# Patient Record
Sex: FEMALE | Race: WHITE | NOT HISPANIC OR LATINO | Employment: FULL TIME | URBAN - METROPOLITAN AREA
[De-identification: names, ages, dates, MRNs, and addresses within clinical notes are randomized per-mention and may not be internally consistent; named-entity substitution may affect disease eponyms.]

---

## 2017-01-13 ENCOUNTER — ALLSCRIPTS OFFICE VISIT (OUTPATIENT)
Dept: OTHER | Facility: OTHER | Age: 42
End: 2017-01-13

## 2017-03-16 ENCOUNTER — ALLSCRIPTS OFFICE VISIT (OUTPATIENT)
Dept: OTHER | Facility: OTHER | Age: 42
End: 2017-03-16

## 2017-04-06 ENCOUNTER — ALLSCRIPTS OFFICE VISIT (OUTPATIENT)
Dept: OTHER | Facility: OTHER | Age: 42
End: 2017-04-06

## 2017-05-01 ENCOUNTER — ALLSCRIPTS OFFICE VISIT (OUTPATIENT)
Dept: OTHER | Facility: OTHER | Age: 42
End: 2017-05-01

## 2017-05-16 ENCOUNTER — GENERIC CONVERSION - ENCOUNTER (OUTPATIENT)
Dept: OTHER | Facility: OTHER | Age: 42
End: 2017-05-16

## 2017-06-09 DIAGNOSIS — T78.40XA ALLERGIC STATE: ICD-10-CM

## 2017-06-13 ENCOUNTER — APPOINTMENT (OUTPATIENT)
Dept: LAB | Facility: CLINIC | Age: 42
End: 2017-06-13
Payer: COMMERCIAL

## 2017-06-13 DIAGNOSIS — T78.40XA ALLERGIC STATE: ICD-10-CM

## 2017-06-13 PROCEDURE — 36415 COLL VENOUS BLD VENIPUNCTURE: CPT

## 2017-06-13 PROCEDURE — 86038 ANTINUCLEAR ANTIBODIES: CPT

## 2017-06-14 ENCOUNTER — GENERIC CONVERSION - ENCOUNTER (OUTPATIENT)
Dept: OTHER | Facility: OTHER | Age: 42
End: 2017-06-14

## 2017-06-14 LAB — RYE IGE QN: NEGATIVE

## 2017-07-01 DIAGNOSIS — E55.9 VITAMIN D DEFICIENCY: ICD-10-CM

## 2017-07-02 ENCOUNTER — APPOINTMENT (EMERGENCY)
Dept: CT IMAGING | Facility: HOSPITAL | Age: 42
End: 2017-07-02
Payer: COMMERCIAL

## 2017-07-02 ENCOUNTER — HOSPITAL ENCOUNTER (OUTPATIENT)
Facility: HOSPITAL | Age: 42
Setting detail: OBSERVATION
Discharge: LEFT BEFORE DISCHARGE | End: 2017-07-03
Attending: EMERGENCY MEDICINE | Admitting: HOSPITALIST
Payer: COMMERCIAL

## 2017-07-02 DIAGNOSIS — K52.9 GASTROENTERITIS: Primary | ICD-10-CM

## 2017-07-02 DIAGNOSIS — R10.9 ABDOMINAL PAIN: ICD-10-CM

## 2017-07-02 LAB
ALBUMIN SERPL BCP-MCNC: 4.7 G/DL (ref 3.5–5)
ALP SERPL-CCNC: 68 U/L (ref 46–116)
ALT SERPL W P-5'-P-CCNC: 30 U/L (ref 12–78)
ANION GAP SERPL CALCULATED.3IONS-SCNC: 12 MMOL/L (ref 4–13)
AST SERPL W P-5'-P-CCNC: 21 U/L (ref 5–45)
BASOPHILS # BLD AUTO: 0.04 THOUSANDS/ΜL (ref 0–0.1)
BASOPHILS NFR BLD AUTO: 0 % (ref 0–1)
BILIRUB SERPL-MCNC: 0.5 MG/DL (ref 0.2–1)
BILIRUB UR QL STRIP: NEGATIVE
BUN SERPL-MCNC: 28 MG/DL (ref 5–25)
CALCIUM SERPL-MCNC: 9.7 MG/DL (ref 8.3–10.1)
CHLORIDE SERPL-SCNC: 98 MMOL/L (ref 100–108)
CLARITY UR: CLEAR
CLARITY, POC: NORMAL
CO2 SERPL-SCNC: 33 MMOL/L (ref 21–32)
COLOR UR: YELLOW
COLOR, POC: YELLOW
CREAT SERPL-MCNC: 0.8 MG/DL (ref 0.6–1.3)
EOSINOPHIL # BLD AUTO: 0.04 THOUSAND/ΜL (ref 0–0.61)
EOSINOPHIL NFR BLD AUTO: 0 % (ref 0–6)
ERYTHROCYTE [DISTWIDTH] IN BLOOD BY AUTOMATED COUNT: 12.4 % (ref 11.6–15.1)
EXT BILIRUBIN, UA: NEGATIVE
EXT BLOOD URINE: NEGATIVE
EXT GLUCOSE, UA: NEGATIVE
EXT KETONES: NORMAL
EXT NITRITE, UA: NEGATIVE
EXT PH, UA: 5
EXT PROTEIN, UA: NORMAL
EXT SPECIFIC GRAVITY, UA: 1.03
EXT UROBILINOGEN: 0.2
GFR SERPL CREATININE-BSD FRML MDRD: >60 ML/MIN/1.73SQ M
GLUCOSE SERPL-MCNC: 98 MG/DL (ref 65–140)
GLUCOSE UR STRIP-MCNC: NEGATIVE MG/DL
HCG UR QL: NEGATIVE
HCT VFR BLD AUTO: 47.8 % (ref 34.8–46.1)
HGB BLD-MCNC: 16.2 G/DL (ref 11.5–15.4)
HGB UR QL STRIP.AUTO: NEGATIVE
HOLD SPECIMEN: NORMAL
KETONES UR STRIP-MCNC: ABNORMAL MG/DL
LACTATE SERPL-SCNC: 1.1 MMOL/L (ref 0.5–2)
LEUKOCYTE ESTERASE UR QL STRIP: NEGATIVE
LIPASE SERPL-CCNC: 191 U/L (ref 73–393)
LYMPHOCYTES # BLD AUTO: 1.8 THOUSANDS/ΜL (ref 0.6–4.47)
LYMPHOCYTES NFR BLD AUTO: 9 % (ref 14–44)
MCH RBC QN AUTO: 29.8 PG (ref 26.8–34.3)
MCHC RBC AUTO-ENTMCNC: 33.9 G/DL (ref 31.4–37.4)
MCV RBC AUTO: 88 FL (ref 82–98)
MONOCYTES # BLD AUTO: 1.94 THOUSAND/ΜL (ref 0.17–1.22)
MONOCYTES NFR BLD AUTO: 10 % (ref 4–12)
NEUTROPHILS # BLD AUTO: 15.61 THOUSANDS/ΜL (ref 1.85–7.62)
NEUTS SEG NFR BLD AUTO: 81 % (ref 43–75)
NITRITE UR QL STRIP: NEGATIVE
PH UR STRIP.AUTO: 5 [PH] (ref 4.5–8)
PLATELET # BLD AUTO: 345 THOUSANDS/UL (ref 149–390)
PMV BLD AUTO: 10 FL (ref 8.9–12.7)
POTASSIUM SERPL-SCNC: 3.5 MMOL/L (ref 3.5–5.3)
PROT SERPL-MCNC: 8.8 G/DL (ref 6.4–8.2)
PROT UR STRIP-MCNC: NEGATIVE MG/DL
RBC # BLD AUTO: 5.43 MILLION/UL (ref 3.81–5.12)
SODIUM SERPL-SCNC: 143 MMOL/L (ref 136–145)
SP GR UR STRIP.AUTO: >=1.03 (ref 1–1.03)
UROBILINOGEN UR QL STRIP.AUTO: 0.2 E.U./DL
WBC # BLD AUTO: 19.43 THOUSAND/UL (ref 4.31–10.16)
WBC # BLD EST: NEGATIVE 10*3/UL

## 2017-07-02 PROCEDURE — 96361 HYDRATE IV INFUSION ADD-ON: CPT

## 2017-07-02 PROCEDURE — 81002 URINALYSIS NONAUTO W/O SCOPE: CPT | Performed by: EMERGENCY MEDICINE

## 2017-07-02 PROCEDURE — 96375 TX/PRO/DX INJ NEW DRUG ADDON: CPT

## 2017-07-02 PROCEDURE — 74177 CT ABD & PELVIS W/CONTRAST: CPT

## 2017-07-02 PROCEDURE — 87040 BLOOD CULTURE FOR BACTERIA: CPT | Performed by: EMERGENCY MEDICINE

## 2017-07-02 PROCEDURE — 80053 COMPREHEN METABOLIC PANEL: CPT | Performed by: EMERGENCY MEDICINE

## 2017-07-02 PROCEDURE — 85025 COMPLETE CBC W/AUTO DIFF WBC: CPT | Performed by: EMERGENCY MEDICINE

## 2017-07-02 PROCEDURE — 81025 URINE PREGNANCY TEST: CPT | Performed by: EMERGENCY MEDICINE

## 2017-07-02 PROCEDURE — 83605 ASSAY OF LACTIC ACID: CPT | Performed by: EMERGENCY MEDICINE

## 2017-07-02 PROCEDURE — 93005 ELECTROCARDIOGRAM TRACING: CPT | Performed by: EMERGENCY MEDICINE

## 2017-07-02 PROCEDURE — 83690 ASSAY OF LIPASE: CPT | Performed by: EMERGENCY MEDICINE

## 2017-07-02 PROCEDURE — 36415 COLL VENOUS BLD VENIPUNCTURE: CPT | Performed by: EMERGENCY MEDICINE

## 2017-07-02 PROCEDURE — 81003 URINALYSIS AUTO W/O SCOPE: CPT | Performed by: EMERGENCY MEDICINE

## 2017-07-02 RX ORDER — SODIUM CHLORIDE 9 MG/ML
125 INJECTION, SOLUTION INTRAVENOUS CONTINUOUS
Status: DISCONTINUED | OUTPATIENT
Start: 2017-07-02 | End: 2017-07-03 | Stop reason: HOSPADM

## 2017-07-02 RX ORDER — ONDANSETRON 2 MG/ML
4 INJECTION INTRAMUSCULAR; INTRAVENOUS ONCE
Status: COMPLETED | OUTPATIENT
Start: 2017-07-02 | End: 2017-07-02

## 2017-07-02 RX ORDER — DIPHENOXYLATE HYDROCHLORIDE AND ATROPINE SULFATE 2.5; .025 MG/1; MG/1
1 TABLET ORAL DAILY
COMMUNITY
End: 2018-10-09

## 2017-07-02 RX ORDER — ONDANSETRON 2 MG/ML
4 INJECTION INTRAMUSCULAR; INTRAVENOUS ONCE
Status: COMPLETED | OUTPATIENT
Start: 2017-07-02 | End: 2017-07-03

## 2017-07-02 RX ORDER — ONDANSETRON 2 MG/ML
INJECTION INTRAMUSCULAR; INTRAVENOUS
Status: COMPLETED
Start: 2017-07-02 | End: 2017-07-02

## 2017-07-02 RX ORDER — ONDANSETRON HYDROCHLORIDE 4 MG/5ML
4 SOLUTION ORAL ONCE
Status: DISCONTINUED | OUTPATIENT
Start: 2017-07-02 | End: 2017-07-03 | Stop reason: HOSPADM

## 2017-07-02 RX ORDER — NADOLOL 20 MG/1
20 TABLET ORAL DAILY
COMMUNITY
End: 2018-04-10 | Stop reason: SDUPTHER

## 2017-07-02 RX ORDER — LEVOFLOXACIN 5 MG/ML
750 INJECTION, SOLUTION INTRAVENOUS ONCE
Status: COMPLETED | OUTPATIENT
Start: 2017-07-02 | End: 2017-07-03

## 2017-07-02 RX ADMIN — FAMOTIDINE 20 MG: 10 INJECTION, SOLUTION INTRAVENOUS at 22:20

## 2017-07-02 RX ADMIN — ONDANSETRON 4 MG: 2 INJECTION INTRAMUSCULAR; INTRAVENOUS at 22:28

## 2017-07-02 RX ADMIN — IOHEXOL 50 ML: 240 INJECTION, SOLUTION INTRATHECAL; INTRAVASCULAR; INTRAVENOUS; ORAL at 22:20

## 2017-07-02 RX ADMIN — SODIUM CHLORIDE 500 ML: 0.9 INJECTION, SOLUTION INTRAVENOUS at 22:21

## 2017-07-02 RX ADMIN — SODIUM CHLORIDE 500 ML: 0.9 INJECTION, SOLUTION INTRAVENOUS at 22:15

## 2017-07-03 ENCOUNTER — APPOINTMENT (OUTPATIENT)
Dept: ULTRASOUND IMAGING | Facility: HOSPITAL | Age: 42
End: 2017-07-03
Payer: COMMERCIAL

## 2017-07-03 VITALS
WEIGHT: 133.4 LBS | SYSTOLIC BLOOD PRESSURE: 135 MMHG | OXYGEN SATURATION: 100 % | RESPIRATION RATE: 18 BRPM | TEMPERATURE: 97.8 F | HEIGHT: 64 IN | HEART RATE: 72 BPM | BODY MASS INDEX: 22.77 KG/M2 | DIASTOLIC BLOOD PRESSURE: 82 MMHG

## 2017-07-03 PROBLEM — K52.9 GASTROENTERITIS: Status: ACTIVE | Noted: 2017-07-03

## 2017-07-03 PROBLEM — R10.9 ABDOMINAL PAIN: Status: ACTIVE | Noted: 2017-07-03

## 2017-07-03 PROBLEM — A41.9 SEPSIS (HCC): Status: ACTIVE | Noted: 2017-07-03

## 2017-07-03 LAB
ALBUMIN SERPL BCP-MCNC: 3.5 G/DL (ref 3.5–5)
ALP SERPL-CCNC: 48 U/L (ref 46–116)
ALT SERPL W P-5'-P-CCNC: 25 U/L (ref 12–78)
ANION GAP SERPL CALCULATED.3IONS-SCNC: 8 MMOL/L (ref 4–13)
AST SERPL W P-5'-P-CCNC: 18 U/L (ref 5–45)
ATRIAL RATE: 84 BPM
BILIRUB DIRECT SERPL-MCNC: 0.16 MG/DL (ref 0–0.2)
BILIRUB SERPL-MCNC: 0.7 MG/DL (ref 0.2–1)
BUN SERPL-MCNC: 19 MG/DL (ref 5–25)
CALCIUM SERPL-MCNC: 8.7 MG/DL (ref 8.3–10.1)
CHLORIDE SERPL-SCNC: 106 MMOL/L (ref 100–108)
CO2 SERPL-SCNC: 30 MMOL/L (ref 21–32)
CREAT SERPL-MCNC: 0.59 MG/DL (ref 0.6–1.3)
ERYTHROCYTE [DISTWIDTH] IN BLOOD BY AUTOMATED COUNT: 12.5 % (ref 11.6–15.1)
GFR SERPL CREATININE-BSD FRML MDRD: >60 ML/MIN/1.73SQ M
GLUCOSE P FAST SERPL-MCNC: 105 MG/DL (ref 65–99)
GLUCOSE SERPL-MCNC: 105 MG/DL (ref 65–140)
HCT VFR BLD AUTO: 41.7 % (ref 34.8–46.1)
HGB BLD-MCNC: 14.1 G/DL (ref 11.5–15.4)
LIPASE SERPL-CCNC: 110 U/L (ref 73–393)
MCH RBC QN AUTO: 29.9 PG (ref 26.8–34.3)
MCHC RBC AUTO-ENTMCNC: 33.8 G/DL (ref 31.4–37.4)
MCV RBC AUTO: 89 FL (ref 82–98)
P AXIS: 64 DEGREES
PLATELET # BLD AUTO: 294 THOUSANDS/UL (ref 149–390)
PMV BLD AUTO: 10 FL (ref 8.9–12.7)
POTASSIUM SERPL-SCNC: 3.8 MMOL/L (ref 3.5–5.3)
PR INTERVAL: 186 MS
PROT SERPL-MCNC: 6.8 G/DL (ref 6.4–8.2)
QRS AXIS: 32 DEGREES
QRSD INTERVAL: 84 MS
QT INTERVAL: 376 MS
QTC INTERVAL: 444 MS
RBC # BLD AUTO: 4.71 MILLION/UL (ref 3.81–5.12)
SODIUM SERPL-SCNC: 144 MMOL/L (ref 136–145)
T WAVE AXIS: 41 DEGREES
VENTRICULAR RATE: 84 BPM
WBC # BLD AUTO: 9.01 THOUSAND/UL (ref 4.31–10.16)

## 2017-07-03 PROCEDURE — 83690 ASSAY OF LIPASE: CPT | Performed by: HOSPITALIST

## 2017-07-03 PROCEDURE — 76705 ECHO EXAM OF ABDOMEN: CPT

## 2017-07-03 PROCEDURE — C9113 INJ PANTOPRAZOLE SODIUM, VIA: HCPCS | Performed by: SURGERY

## 2017-07-03 PROCEDURE — 80048 BASIC METABOLIC PNL TOTAL CA: CPT | Performed by: PHYSICIAN ASSISTANT

## 2017-07-03 PROCEDURE — 99285 EMERGENCY DEPT VISIT HI MDM: CPT

## 2017-07-03 PROCEDURE — 85027 COMPLETE CBC AUTOMATED: CPT | Performed by: PHYSICIAN ASSISTANT

## 2017-07-03 PROCEDURE — 80076 HEPATIC FUNCTION PANEL: CPT | Performed by: HOSPITALIST

## 2017-07-03 PROCEDURE — 96365 THER/PROPH/DIAG IV INF INIT: CPT

## 2017-07-03 PROCEDURE — 96376 TX/PRO/DX INJ SAME DRUG ADON: CPT

## 2017-07-03 PROCEDURE — 96367 TX/PROPH/DG ADDL SEQ IV INF: CPT

## 2017-07-03 RX ORDER — ONDANSETRON 2 MG/ML
4 INJECTION INTRAMUSCULAR; INTRAVENOUS EVERY 6 HOURS PRN
Status: DISCONTINUED | OUTPATIENT
Start: 2017-07-03 | End: 2017-07-03 | Stop reason: HOSPADM

## 2017-07-03 RX ORDER — LACTOBACILLUS ACIDOPHILUS / LACTOBACILLUS BULGARICUS 100 MILLION CFU STRENGTH
1 GRANULES ORAL DAILY
Status: DISCONTINUED | OUTPATIENT
Start: 2017-07-03 | End: 2017-07-03 | Stop reason: HOSPADM

## 2017-07-03 RX ORDER — NADOLOL 40 MG/1
20 TABLET ORAL DAILY
Status: DISCONTINUED | OUTPATIENT
Start: 2017-07-03 | End: 2017-07-03 | Stop reason: HOSPADM

## 2017-07-03 RX ORDER — SODIUM CHLORIDE, SODIUM LACTATE, POTASSIUM CHLORIDE, CALCIUM CHLORIDE 600; 310; 30; 20 MG/100ML; MG/100ML; MG/100ML; MG/100ML
150 INJECTION, SOLUTION INTRAVENOUS CONTINUOUS
Status: DISCONTINUED | OUTPATIENT
Start: 2017-07-03 | End: 2017-07-03 | Stop reason: HOSPADM

## 2017-07-03 RX ORDER — CALCIUM CARBONATE 200(500)MG
1000 TABLET,CHEWABLE ORAL DAILY PRN
Status: DISCONTINUED | OUTPATIENT
Start: 2017-07-03 | End: 2017-07-03 | Stop reason: HOSPADM

## 2017-07-03 RX ORDER — PANTOPRAZOLE SODIUM 40 MG/1
40 INJECTION, POWDER, FOR SOLUTION INTRAVENOUS
Status: DISCONTINUED | OUTPATIENT
Start: 2017-07-03 | End: 2017-07-03 | Stop reason: HOSPADM

## 2017-07-03 RX ORDER — CHOLECALCIFEROL (VITAMIN D3) 125 MCG
1 CAPSULE ORAL DAILY
COMMUNITY
End: 2019-11-08

## 2017-07-03 RX ORDER — DIPHENHYDRAMINE HYDROCHLORIDE 50 MG/ML
25 INJECTION INTRAMUSCULAR; INTRAVENOUS ONCE
Status: DISCONTINUED | OUTPATIENT
Start: 2017-07-03 | End: 2017-07-03 | Stop reason: HOSPADM

## 2017-07-03 RX ORDER — SENNOSIDES 8.6 MG
1 TABLET ORAL DAILY
Status: DISCONTINUED | OUTPATIENT
Start: 2017-07-03 | End: 2017-07-03 | Stop reason: HOSPADM

## 2017-07-03 RX ADMIN — LEVOFLOXACIN 750 MG: 5 INJECTION, SOLUTION INTRAVENOUS at 00:43

## 2017-07-03 RX ADMIN — SODIUM CHLORIDE, SODIUM LACTATE, POTASSIUM CHLORIDE, AND CALCIUM CHLORIDE 150 ML/HR: .6; .31; .03; .02 INJECTION, SOLUTION INTRAVENOUS at 12:13

## 2017-07-03 RX ADMIN — IOHEXOL 100 ML: 350 INJECTION, SOLUTION INTRAVENOUS at 00:36

## 2017-07-03 RX ADMIN — Medication 1 TABLET: at 09:58

## 2017-07-03 RX ADMIN — NADOLOL 20 MG: 40 TABLET ORAL at 09:58

## 2017-07-03 RX ADMIN — LACTOBACILLUS ACIDOPHILUS / LACTOBACILLUS BULGARICUS 1 PACKET: 100 MILLION CFU STRENGTH GRANULES at 09:59

## 2017-07-03 RX ADMIN — METRONIDAZOLE 500 MG: 500 INJECTION, SOLUTION INTRAVENOUS at 00:06

## 2017-07-03 RX ADMIN — SODIUM CHLORIDE 125 ML/HR: 0.9 INJECTION, SOLUTION INTRAVENOUS at 00:05

## 2017-07-03 RX ADMIN — ONDANSETRON 4 MG: 2 INJECTION INTRAMUSCULAR; INTRAVENOUS at 00:01

## 2017-07-03 RX ADMIN — PANTOPRAZOLE SODIUM 40 MG: 40 INJECTION, POWDER, FOR SOLUTION INTRAVENOUS at 12:24

## 2017-07-05 ENCOUNTER — GENERIC CONVERSION - ENCOUNTER (OUTPATIENT)
Dept: OTHER | Facility: OTHER | Age: 42
End: 2017-07-05

## 2017-07-08 LAB — BACTERIA BLD CULT: NORMAL

## 2017-07-11 ENCOUNTER — ALLSCRIPTS OFFICE VISIT (OUTPATIENT)
Dept: OTHER | Facility: OTHER | Age: 42
End: 2017-07-11

## 2017-07-11 ENCOUNTER — GENERIC CONVERSION - ENCOUNTER (OUTPATIENT)
Dept: OTHER | Facility: OTHER | Age: 42
End: 2017-07-11

## 2017-07-11 ENCOUNTER — APPOINTMENT (OUTPATIENT)
Dept: LAB | Facility: CLINIC | Age: 42
End: 2017-07-11
Payer: COMMERCIAL

## 2017-07-11 DIAGNOSIS — E55.9 VITAMIN D DEFICIENCY: ICD-10-CM

## 2017-07-11 LAB — 25(OH)D3 SERPL-MCNC: 51 NG/ML (ref 30–100)

## 2017-07-11 PROCEDURE — 36415 COLL VENOUS BLD VENIPUNCTURE: CPT

## 2017-07-11 PROCEDURE — 82306 VITAMIN D 25 HYDROXY: CPT

## 2017-10-09 ENCOUNTER — APPOINTMENT (OUTPATIENT)
Dept: LAB | Facility: CLINIC | Age: 42
End: 2017-10-09
Payer: COMMERCIAL

## 2017-10-09 ENCOUNTER — GENERIC CONVERSION - ENCOUNTER (OUTPATIENT)
Dept: OTHER | Facility: OTHER | Age: 42
End: 2017-10-09

## 2017-10-09 ENCOUNTER — ALLSCRIPTS OFFICE VISIT (OUTPATIENT)
Dept: OTHER | Facility: OTHER | Age: 42
End: 2017-10-09

## 2017-10-09 DIAGNOSIS — M25.50 PAIN IN JOINT: ICD-10-CM

## 2017-10-09 LAB
CRP SERPL QL: <3 MG/L
ERYTHROCYTE [SEDIMENTATION RATE] IN BLOOD: 8 MM/HOUR (ref 0–20)

## 2017-10-09 PROCEDURE — 86038 ANTINUCLEAR ANTIBODIES: CPT

## 2017-10-09 PROCEDURE — 85652 RBC SED RATE AUTOMATED: CPT

## 2017-10-09 PROCEDURE — 81374 HLA I TYPING 1 ANTIGEN LR: CPT

## 2017-10-09 PROCEDURE — 86430 RHEUMATOID FACTOR TEST QUAL: CPT

## 2017-10-09 PROCEDURE — 86140 C-REACTIVE PROTEIN: CPT

## 2017-10-09 PROCEDURE — 86225 DNA ANTIBODY NATIVE: CPT

## 2017-10-09 PROCEDURE — 36415 COLL VENOUS BLD VENIPUNCTURE: CPT

## 2017-10-09 PROCEDURE — 86618 LYME DISEASE ANTIBODY: CPT

## 2017-10-10 ENCOUNTER — GENERIC CONVERSION - ENCOUNTER (OUTPATIENT)
Dept: OTHER | Facility: OTHER | Age: 42
End: 2017-10-10

## 2017-10-10 LAB
B BURGDOR IGG SER IA-ACNC: 0.23
B BURGDOR IGM SER IA-ACNC: 0.23
DSDNA AB SER-ACNC: <1 IU/ML (ref 0–9)
RHEUMATOID FACT SER QL LA: NEGATIVE

## 2017-10-10 NOTE — PROGRESS NOTES
Assessment  1  Benign essential hypertension (401 1) (I10)   2  Vitamin D deficiency (268 9) (E55 9)   3  Need for influenza vaccination (V04 81) (Z23)   4  Multiple joint pain (719 49) (M25 50)   5  Family history of HLA B27 positive : Sister   10  Family history of ulcerative colitis (V18 59) (Z83 79) : Sister    Plan  Multiple joint pain    · (1) ELVIRA SCREEN W/REFLEX TO TITER/PATTERN; Status:Active; Requested  XQR:[id number];    · (1) C-REACTIVE PROTEIN; Status:Active; Requested HIA:[id number];    · (1) DNA (DS) ANTIBODY; Status:Active; Requested JESSICA:[id number];    · (1) HLA B27; Status:Active; Requested RCO:[id number];    · (1) LYME ANTIBODY PROFILE W/REFLEX TO WESTERN BLOT; Status:Active; Requested  VXN:[id number];    · (1) RHEUMATOID FACTOR SCREEN; Status:Active; Requested BKS:[id number];    · (1) SED RATE; Status:Active; Requested AILEEN:[id number];   Need for influenza vaccination    · Fluzone Quadrivalent 0 5 ML Intramuscular Suspension Prefilled Syringe    Discussion/Summary    FAMIL HISTORY OF HLAB27, WORSENING HIP AND KNEE PAINTO BE ALLERGIC TO VITAMIN D PILLS- FACIAL ITCHING  Chief Complaint  Patient here for 6 month follow up on Hypertension   Patient is here today for follow up of chronic conditions described in HPI  History of Present Illness  since recovery from july 4th illness, rest of summer was finegood except hip painby rheum 5-6 years ago, diagnosed with fibromyalgia- was given amytriplytinepain, right knee   The patient presents for follow-up of essential hypertension  The patient states she has been doing well with her blood pressure control since the last visit  She has no significant interval events  Symptoms: The patient is currently asymptomatic  The patient is being seen for follow-up of vitamin D deficiency  Recent laboratory results: 25-hydroxyvitamin D 51 ng/mL  Current treatment includes dietary vitamin D  Symptoms:  bone pain  Kate Nash presents with complaints of joint pain  Associated symptoms include migratory joint pain,-joint stiffness,-decreased range of motion-and-morning stiffness, but-no joint swelling,-no fatigue,-no muscle weakness,-no myalgia,-no fever,-no chills,-no cough,-no shortness of breath,-no paresthesias,-no rash,-no dry mouth,-no dry eyes,-no red eyes,-no eye pain-and-no dysuria  Review of Systems    Constitutional: No fever, no chills, feels well, no tiredness, no recent weight gain or weight loss  Eyes: No complaints of eye pain, no red eyes, no eyesight problems, no discharge, no dry eyes, no itching of eyes  ENT: no complaints of earache, no loss of hearing, no nose bleeds, no nasal discharge, no sore throat, no hoarseness  Cardiovascular: No complaints of slow heart rate, no fast heart rate, no chest pain, no palpitations, no leg claudication, no lower extremity edema  Respiratory: No complaints of shortness of breath, no wheezing, no cough, no SOB on exertion, no orthopnea, no PND  Gastrointestinal: No complaints of abdominal pain, no constipation, no nausea or vomiting, no diarrhea, no bloody stools  Genitourinary: No complaints of dysuria, no incontinence, no pelvic pain, no dysmenorrhea, no vaginal discharge or bleeding  Musculoskeletal: arthralgias-and-joint, but-as noted in HPI  Integumentary: No complaints of skin rash or lesions, no itching, no skin wounds, no breast pain or lump  Neurological: No complaints of headache, no confusion, no convulsions, no numbness, no dizziness or fainting, no tingling, no limb weakness, no difficulty walking  Psychiatric: Not suicidal, no sleep disturbance, no anxiety or depression, no change in personality, no emotional problems  Endocrine: No complaints of proptosis, no hot flashes, no muscle weakness, no deepening of the voice, no feelings of weakness  Hematologic/Lymphatic: No complaints of swollen glands, no swollen glands in the neck, does not bleed easily, does not bruise easily  Active Problems  1  Benign essential hypertension (401 1) (I10)   2  Bone Cyst (733 20)   3  GERD (gastroesophageal reflux disease) (530 81) (K21 9)   4  Headache, migraine (346 90) (G43 909)   5  History of hysterectomy with oophorectomy (V88 01)   6  Irritable bowel syndrome (564 1) (K58 9)   7  Need for influenza vaccination (V04 81) (Z23)   8  Screening for breast cancer (V76 10) (Z12 31)   9  Spider 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 9) (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)   10  Vitamin D deficiency (268 9) (E55 9)   11  Well adult on routine health check (V70 0) (Z00 00)    Past Medical History  1  History of Acute bacterial conjunctivitis of left eye (372 03) (H10 32)   2  History of Acute maxillary sinusitis (461 0) (J01 00)   3  History of Fibromyalgia (729 1) (M79 7)   4  History of migraine (V12 49) (Z86 69)   5  History of Other muscle spasm (728 85) (M62 838)   6  History of Perioral dermatitis (695 3) (L71 0)    The active problems and past medical history were reviewed and updated today  Surgical History  1  History of Colonoscopy (Fiberoptic)   2  History of Dilation And Curettage   3  History of Drainage Of Ovarian Cyst(S)   4  History of Hysterectomy   5  History of Hysteroscopy With Endometrial Ablation   6  History of Tonsillectomy    The surgical history was reviewed and updated today  Family History  Mother    1  Family history of Essential Hypertension   2  Family history of Type 2 Diabetes Mellitus  Father    3  Family history of Melanoma  Sister    4  Family history of ulcerative colitis (V18 59) (Z83 79)   5  Family history of HLA B27 positive  Family History    6  Family history of Asthma   7  Family history of Depression    The family history was reviewed and updated today  Social History   · Drinks coffee   · Never A Smoker   · No alcohol use   · Non-smoker (V49 89) (Z78 9)  The social history was reviewed and updated today  Current Meds   1   Nadolol 20 MG Oral Tablet; TAKE 1 TABLET DAILY; Therapy: 00BQX5027 to (Evaluate:10Mar2018)  Requested for: 11RXI1012; Last   Rx:15Mar2017 Ordered   2  Womens Multi Oral Capsule; TAKE AS DIRECTED; Therapy: 46ZWU2468 to Recorded    The medication list was reviewed and updated today  Allergies  1  Erythromycin Base TABS   2  D3 High Potency CAPS  3  No Known Food Allergies   4  Seasonal    Vitals  Vital Signs    Recorded: 18BLB1123 01:23PM   Heart Rate 64   Respiration 16   Systolic 123   Diastolic 88   Height 5 ft 4 69 in   Weight 136 lb 8 oz   BMI Calculated 22 93   BSA Calculated 1 68     Physical Exam    Constitutional   General appearance: No acute distress, well appearing and well nourished  Pulmonary   Respiratory effort: No increased work of breathing or signs of respiratory distress  Auscultation of lungs: Clear to auscultation  Cardiovascular   Auscultation of heart: Normal rate and rhythm, normal S1 and S2, without murmurs  Examination of extremities for edema and/or varicosities: Normal     Abdomen   Abdomen: Non-tender, no masses  Liver and spleen: No hepatomegaly or splenomegaly  Lymphatic   Palpation of lymph nodes in neck: No lymphadenopathy  Musculoskeletal   Gait and station: Normal          Results/Data  (1) VITAMIN D 25-HYDROXY 92SUN8100 01:55PM [facility] Order Number: [id number]     Test Name Result Flag Reference   VIT D 25-HYDROX 51 0 ng/mL  30 0-100 0   This assay is a certified procedure of the CDC Vitamin D Standardization Certification Program (VDSCP)     Deficiency <20ng/ml   Insufficiency 20-30ng/ml   Sufficient  ng/ml     *Patients undergoing fluorescein dye angiography may retain small amounts of fluorescein in the body for 48-72 hours post procedure  Samples containing fluorescein can produce falsely elevated Vitamin D values  If the patient had this procedure, a specimen should be resubmitted post fluorescein clearance       Future Appointments    Date/Time Provider Specialty Site   04/10/2018 01:15 PM Tyree Melendrez DO Family Medicine [facility]     Signatures   Electronically signed by : Saúl Calvin DO; Oct  9 2017  2:10PM EST                       (Author)

## 2017-10-11 ENCOUNTER — GENERIC CONVERSION - ENCOUNTER (OUTPATIENT)
Dept: OTHER | Facility: OTHER | Age: 42
End: 2017-10-11

## 2017-10-11 LAB — RYE IGE QN: NEGATIVE

## 2017-10-16 ENCOUNTER — GENERIC CONVERSION - ENCOUNTER (OUTPATIENT)
Dept: OTHER | Facility: OTHER | Age: 42
End: 2017-10-16

## 2017-10-16 LAB — HLA-B27 QL NAA+PROBE: NEGATIVE

## 2017-11-15 ENCOUNTER — ALLSCRIPTS OFFICE VISIT (OUTPATIENT)
Dept: OTHER | Facility: OTHER | Age: 42
End: 2017-11-15

## 2017-11-16 NOTE — PROGRESS NOTES
Assessment    1  Acute non-recurrent maxillary sinusitis (461 0) (J01 00)    Plan  Acute non-recurrent maxillary sinusitis    · Amoxicillin-Pot Clavulanate 875-125 MG Oral Tablet (Augmentin); TAKE 1 TABLETEVERY 12 HOURS WITH MEALS UNTIL GONE    Chief Complaint    1  Cold Symptoms  Patient here with on Sunday started with a scratchy throat and a cough now has congestion facial pain post nasal drip no fever      History of Present Illness  HPI: started last weekendcontactsworse todaysinus-with some relief   Cold Symptoms: Jennifer Gerard presents with complaints of cold symptoms  Associated symptoms include nasal congestion,-- runny nose,-- post nasal drainage,-- scratchy throat,-- dry cough,-- facial pressure,-- facial pain,-- headache,-- plugged ear(s),-- ear pain-- and-- nausea, but-- no sore throat,-- no wheezing,-- no shortness of breath,-- no vomiting,-- no diarrhea,-- no fever-- and-- no chills  Review of Systems   Constitutional: as noted in HPI   ENT: as noted in HPI  Cardiovascular: as noted in HPI  Respiratory: as noted in HPI  Breasts: no complaints of breast pain, breast lump or nipple discharge  Gastrointestinal: as noted in HPI  Genitourinary: no complaints of dysuria, no incontinence, no pelvic pain, no dysmenorrhea, no vaginal discharge or abnormal vaginal bleeding  Musculoskeletal: no complaints of arthralgia, no myalgia, no joint swelling or stiffness, no limb pain or swelling  Integumentary: no complaints of skin rash or lesion, no itching or dry skin, no skin wounds  Neurological: no complaints of headache, no confusion, no numbness or tingling, no dizziness or fainting  Active Problems  1  Benign essential hypertension (401 1) (I10)   2  Bone Cyst (733 20)   3  GERD (gastroesophageal reflux disease) (530 81) (K21 9)   4  Headache, migraine (346 90) (G43 909)   5  History of hysterectomy with oophorectomy (V88 01)   6  Irritable bowel syndrome (564 1) (K58 9)   7   Multiple joint pain (719 49) (M25 50)   8  Need for influenza vaccination (V04 81) (Z23)   9  Screening for breast cancer (V76 10) (Z12 31)   10  Spider 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 9) (I83 93)   11  Vitamin D deficiency (268 9) (E55 9)   12  Well adult on routine health check (V70 0) (Z00 00)    Past Medical History  1  History of Acute bacterial conjunctivitis of left eye (372 03) (H10 32)   2  History of Acute maxillary sinusitis (461 0) (J01 00)   3  History of Fibromyalgia (729 1) (M79 7)   4  History of migraine (V12 49) (Z86 69)   5  History of Other muscle spasm (728 85) (M62 838)   6  History of Perioral dermatitis (695 3) (L71 0)  Active Problems And Past Medical History Reviewed: The active problems and past medical history were reviewed and updated today  Family History  Mother    1  Family history of Essential Hypertension   2  Family history of Type 2 Diabetes Mellitus  Father    3  Family history of Melanoma  Sister    4  Family history of ulcerative colitis (V18 59) (Z83 79)   5  Family history of HLA B27 positive  Family History    6  Family history of Asthma   7  Family history of Depression  Family History Reviewed: The family history was reviewed and updated today  Social History   · Drinks coffee   · Never A Smoker   · No alcohol use   · Non-smoker (V49 89) (Z78 9)  The social history was reviewed and updated today  Surgical History    1  History of Colonoscopy (Fiberoptic)   2  History of Dilation And Curettage   3  History of Drainage Of Ovarian Cyst(S)   4  History of Hysterectomy   5  History of Hysteroscopy With Endometrial Ablation   6  History of Tonsillectomy  Surgical History Reviewed: The surgical history was reviewed and updated today  Current Meds   1  Nadolol 20 MG Oral Tablet; TAKE 1 TABLET DAILY; Therapy: 59NNS7153 to (Evaluate:10Mar2018)  Requested for: 83RKS4230; Last Rx:15Mar2017 Ordered   2  Womens Multi Oral Capsule; TAKE AS DIRECTED;  Therapy: 56FVC0317 to Recorded    The medication list was reviewed and updated today  Allergies  1  Erythromycin Base TABS   2  D3 High Potency CAPS  3  No Known Food Allergies   4  Seasonal    Vitals   Recorded: 84NQO7021 02:45PM   Temperature 97 2 F   Heart Rate 64   Respiration 16   Systolic 461   Diastolic 78   Height 5 ft 4 69 in   Weight 136 lb    BMI Calculated 22 85   BSA Calculated 1 67       Physical Exam   Constitutional  General appearance: No acute distress, well appearing and well nourished  Eyes  Conjunctiva and lids: No swelling, erythema or discharge  Ears, Nose, Mouth, and Throat  External inspection of ears and nose: Normal    Otoscopic examination: Tympanic membranes translucent with normal light reflex  Canals patent without erythema  Nasal mucosa, septum, and turbinates: Normal without edema or erythema  Oropharynx: Abnormal  -- pnd  Pulmonary  Respiratory effort: No increased work of breathing or signs of respiratory distress  Auscultation of lungs: Clear to auscultation  Cardiovascular  Auscultation of heart: Normal rate and rhythm, normal S1 and S2, without murmurs  Examination of extremities for edema and/or varicosities: Normal    Abdomen  Abdomen: Non-tender, no masses  Liver and spleen: No hepatomegaly or splenomegaly  Lymphatic  Palpation of lymph nodes in neck: Abnormal   bilateral anterior cervical node enlargement    Musculoskeletal  Gait and station: Normal          Future Appointments    Date/Time Provider Specialty Site   04/10/2018 01:15 PM Krys Gillis DO Family Medicine [address] [facility]       Signatures   Electronically signed by : Jessica García DO; Nov 15 2017  2:58PM EST                       (Author)

## 2017-12-28 ENCOUNTER — ALLSCRIPTS OFFICE VISIT (OUTPATIENT)
Dept: OTHER | Facility: OTHER | Age: 42
End: 2017-12-28

## 2017-12-29 NOTE — PROGRESS NOTES
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) (T61 8X1A)   4  GERD (gastroesophageal reflux disease) (530 81) (K21 9)   5  Benign essential hypertension (401 1) (I10)    Plan   Abdominal pain, RUQ (right upper quadrant), Toxic effect of other seafood, Vomiting    · Houston Darnell  (Allergy/Immunology) Co-Management  *  Status: Hold For -    Scheduling  Requested for: 41TLC6295  Care Summary provided  : Yes  Abdominal pain, RUQ (right upper quadrant), Vomiting    · * NM HEPATOBILIARY W RX; Status:Need Information - Financial Authorization; Requested for:[id number];     Chief Complaint   Patient here with had Abdominal pain pain in between breast to the right vomiting diarrhea happened twice this month      History of Present Illness   HPI: Dec 14th, went to eat had fried food, same episode as july of night, had ginger ale, felt better friday went to [employer]- had pepperoni dip, crab, both times    Abdominal Pain (Follow-Up): The patient is being seen for follow-up of acute abdominal pain  The patient reports doing poorly  Interval symptoms:  worsened abdominal pain,-- worsened nausea-- and-- worsened vomiting  Associated symptoms: no hematemesis,-- no melena,-- no hematochezia,-- no fever,-- no jaundice,-- no dysuria,-- no hematuria,-- no abnormal vaginal bleeding,-- no vaginal discharge,-- no back pain-- and-- no weight loss  Medications:  the patient is adherent to her medication regimen  Review of Systems        Constitutional: No fever, no chills, feels well, no tiredness, no recent weight gain or loss  ENT: no ear ache, no loss of hearing, no nosebleeds or nasal discharge, no sore throat or hoarseness  Cardiovascular: no complaints of slow or fast heart rate, no chest pain, no palpitations, no leg claudication or lower extremity edema        Respiratory: no complaints of shortness of breath, no wheezing, no dyspnea on exertion, no orthopnea or PND  Breasts: no complaints of breast pain, breast lump or nipple discharge  Gastrointestinal: as noted in HPI  Genitourinary: no complaints of dysuria, no incontinence, no pelvic pain, no dysmenorrhea, no vaginal discharge or abnormal vaginal bleeding  Musculoskeletal: no complaints of arthralgia, no myalgia, no joint swelling or stiffness, no limb pain or swelling  Integumentary: no complaints of skin rash or lesion, no itching or dry skin, no skin wounds  Neurological: no complaints of headache, no confusion, no numbness or tingling, no dizziness or fainting  Active Problems   1  Benign essential hypertension (401 1) (I10)   2  Bone Cyst (733 20)   3  GERD (gastroesophageal reflux disease) (530 81) (K21 9)   4  Headache, migraine (346 90) (G43 909)   5  History of hysterectomy with oophorectomy (V88 01)   6  Irritable bowel syndrome (564 1) (K58 9)   7  Need for influenza vaccination (V04 81) (Z23)   8  Screening for breast cancer (V76 10) (Z12 31)   9  Spider 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 9) (I83 93)   10  Vitamin D deficiency (268 9) (E55 9)   11  Well adult on routine health check (V70 0) (Z00 00)    Past Medical History   1  History of Acute bacterial conjunctivitis of left eye (372 03) (H10 32)   2  History of Acute maxillary sinusitis (461 0) (J01 00)   3  History of Fibromyalgia (729 1) (M79 7)   4  History of migraine (V12 49) (Z86 69)   5  History of Other muscle spasm (728 85) (M62 838)   6  History of Perioral dermatitis (695 3) (L71 0)  Active Problems And Past Medical History Reviewed: The active problems and past medical history were reviewed and updated today  Family History   Mother    1  Family history of Essential Hypertension   2  Family history of Type 2 Diabetes Mellitus  Father    3  Family history of Melanoma  Sister    4   Family history of ulcerative colitis (V18 59) (Z83 79)   5  Family history of HLA B27 positive  Family History    6  Family history of Asthma   7  Family history of Depression  Family History Reviewed: The family history was reviewed and updated today  Social History    · Drinks coffee   · Never A Smoker   · No alcohol use   · Non-smoker (V49 89) (Z78 9)  The social history was reviewed and updated today  Surgical History   1  History of Colonoscopy (Fiberoptic)   2  History of Dilation And Curettage   3  History of Drainage Of Ovarian Cyst(S)   4  History of Hysterectomy   5  History of Hysteroscopy With Endometrial Ablation   6  History of Tonsillectomy  Surgical History Reviewed: The surgical history was reviewed and updated today  Current Meds    1  Nadolol 20 MG Oral Tablet; TAKE 1 TABLET DAILY; Therapy: 70LLK1379 to (Evaluate:10Mar2018)  Requested for: 59VKC5355; Last     Rx:15Mar2017 Ordered   2  Pepcid 20 MG Oral Tablet (Famotidine); TAKE ONE TABLET BY MOUTH AT BEDTIME; Therapy: 74RCL6428 to (Evaluate:26Jun2018) Recorded   3  Womens Multi Oral Capsule; TAKE AS DIRECTED; Therapy: 00DXM8759 to Recorded    Allergies   1  Erythromycin Base TABS   2  D3 High Potency CAPS  3  No Known Food Allergies   4  Seasonal    Vitals    Recorded: 72Wgn4200 04:21PM   Heart Rate 66   Respiration 18   Systolic 631   Diastolic 80   Height 5 ft 4 69 in   Weight 134 lb 6 oz   BMI Calculated 22 58   BSA Calculated 1 66     Physical Exam        Constitutional      General appearance: No acute distress, well appearing and well nourished  Pulmonary      Respiratory effort: No increased work of breathing or signs of respiratory distress  Auscultation of lungs: Clear to auscultation  Cardiovascular      Auscultation of heart: Normal rate and rhythm, normal S1 and S2, without murmurs  Examination of extremities for edema and/or varicosities: Normal        Abdomen      Abdomen: Non-tender, no masses  Liver and spleen: No hepatomegaly or splenomegaly  Lymphatic      Palpation of lymph nodes in neck: No lymphadenopathy            Future Appointments      Date/Time Provider Specialty Site   04/10/2018 01:15 PM Ar Senior DO [facility]     Signatures    Electronically signed by : Gamaliel Arias DO; Dec 28 2017  4:33PM EST                       (Author)

## 2018-01-06 ENCOUNTER — GENERIC CONVERSION - ENCOUNTER (OUTPATIENT)
Dept: OTHER | Facility: OTHER | Age: 43
End: 2018-01-06

## 2018-01-06 ENCOUNTER — HOSPITAL ENCOUNTER (OUTPATIENT)
Dept: RADIOLOGY | Facility: HOSPITAL | Age: 43
Discharge: HOME | End: 2018-01-06
Payer: COMMERCIAL

## 2018-01-06 DIAGNOSIS — G89.29 CHRONIC ABDOMINAL PAIN: ICD-10-CM

## 2018-01-06 DIAGNOSIS — R10.9 CHRONIC ABDOMINAL PAIN: ICD-10-CM

## 2018-01-06 PROCEDURE — 78227 HEPATOBIL SYST IMAGE W/DRUG: CPT

## 2018-01-06 PROCEDURE — A9537 TC99M MEBROFENIN: HCPCS

## 2018-01-06 RX ADMIN — WATER 1.2 ML: 1 INJECTION INTRAMUSCULAR; INTRAVENOUS; SUBCUTANEOUS at 12:00

## 2018-01-06 RX ADMIN — SINCALIDE 1.2 MCG: 5 INJECTION, POWDER, LYOPHILIZED, FOR SOLUTION INTRAVENOUS at 12:00

## 2018-01-08 ENCOUNTER — GENERIC CONVERSION - ENCOUNTER (OUTPATIENT)
Dept: OTHER | Facility: OTHER | Age: 43
End: 2018-01-08

## 2018-01-10 NOTE — PROGRESS NOTES
Chief Complaint  PT here for BP Check      Active Problems    1  Benign essential hypertension (401 1) (I10)   2  Bone Cyst (733 20)   3  GERD (gastroesophageal reflux disease) (530 81) (K21 9)   4  Headache, migraine (346 90) (G43 909)   5  History of hysterectomy with oophorectomy (V88 01) (Z90 710,Z90 721)   6  Irritable bowel syndrome (564 1) (K58 9)   7  Need for influenza vaccination (V04 81) (Z23)   8  Other muscle spasm (728 85) (M62 838)   9  Screening for breast cancer (V76 10) (Z12 39)   10  Vitamin D deficiency (268 9) (E55 9)   11  Well adult on routine health check (V70 0) (Z00 00)    Current Meds   1  Acidophilus Lactobacillus Powder; USE AS DIRECTED; Therapy: 85TGQ5696 to Recorded   2  HydroCHLOROthiazide 12 5 MG Oral Capsule; take 1 capsule daily; Therapy: 24MMX3559 to (Evaluate:81Wzt9174)  Requested for: 04Oct2016; Last   Rx:32Guf8229 Ordered   3  Nadolol 20 MG Oral Tablet; TAKE 1 TABLET DAILY; Therapy: 91NXE0620 to (Evaluate:38Ehi2592)  Requested for: 93Ora9829; Last   Rx:81Fep1343 Ordered   4  Womens Multi Oral Capsule; TAKE AS DIRECTED; Therapy: 64PCL7720 to Recorded    Allergies    1  Erythromycin Base TABS    2  No Known Food Allergies   3   Seasonal    Vitals  Signs    Systolic: 047  Diastolic: 74    Future Appointments    Date/Time Provider Specialty Site   04/06/2017 01:00 PM Antonia Nobles DO Family Medicine [address] [facility]     Signatures   Electronically signed by : Yaya Cat DO; Oct 18 2016 12:38PM EST                       (Author)

## 2018-01-10 NOTE — RESULT NOTES
Discussion/Summary   LUPUS SCREENING NEGATIVE     [facility]     Verified Results  (1) ELVIRA SCREEN W/REFLEX TO TITER/PATTERN 35PXZ3141 02:05PM [facility] Order Number: [id number]     Test Name Result Flag Reference   ELVIRA SCREEN   Negative  Negative

## 2018-01-10 NOTE — PROGRESS NOTES
Assessment    1  Well adult on routine health check (V70 0) (Z00 00)   2  Vitamin D deficiency (268 9) (E55 9)    Plan  Vitamin D deficiency    · Vitamin D (Ergocalciferol) 70875 UNIT Oral Capsule; take 1 capsule weekly   · (1) VITAMIN D 25-HYDROXY; Status:Active; Requested for:47Dcy0719;     Discussion/Summary  health maintenance visit Currently, she eats a healthy diet  cervical cancer screening is managed by GYN Breast cancer screening: self breast exam technique was taught  Colorectal cancer screening: colorectal cancer screening is not indicated  Osteoporosis screening: bone mineral density testing is not indicated  Chief Complaint  Pt here for physical  History of Present Illness  HM, Adult Female: The patient is being seen for a health maintenance evaluation  General Health: The patient's health since the last visit is described as good  She has regular dental visits  She denies vision problems  She denies hearing loss  Immunizations status: up to date  Screening:      Review of Systems    Constitutional: No fever, no chills, feels well, no tiredness, no recent weight gain or weight loss  Eyes: No complaints of eye pain, no red eyes, no eyesight problems, no discharge, no dry eyes, no itching of eyes  ENT: no complaints of earache, no loss of hearing, no nose bleeds, no nasal discharge, no sore throat, no hoarseness  Cardiovascular: No complaints of slow heart rate, no fast heart rate, no chest pain, no palpitations, no leg claudication, no lower extremity edema  Respiratory: No complaints of shortness of breath, no wheezing, no cough, no SOB on exertion, no orthopnea, no PND  Gastrointestinal: No complaints of abdominal pain, no constipation, no nausea or vomiting, no diarrhea, no bloody stools  Genitourinary: No complaints of dysuria, no incontinence, no pelvic pain, no dysmenorrhea, no vaginal discharge or bleeding     Musculoskeletal: No complaints of arthralgias, no myalgias, no joint swelling or stiffness, no limb pain or swelling  Integumentary: No complaints of skin rash or lesions, no itching, no skin wounds, no breast pain or lump  Neurological: No complaints of headache, no confusion, no convulsions, no numbness, no dizziness or fainting, no tingling, no limb weakness, no difficulty walking  Psychiatric: Not suicidal, no sleep disturbance, no anxiety or depression, no change in personality, no emotional problems  Endocrine: No complaints of proptosis, no hot flashes, no muscle weakness, no deepening of the voice, no feelings of weakness  Hematologic/Lymphatic: No complaints of swollen glands, no swollen glands in the neck, does not bleed easily, does not bruise easily  Active Problems    1  Benign essential hypertension (401 1) (I10)   2  Bone Cyst (733 20)   3  GERD (gastroesophageal reflux disease) (530 81) (K21 9)   4  Headache, migraine (346 90) (G43 909)   5  History of hysterectomy with oophorectomy (V88 01) (Z90 710,Z90 721)   6  Irritable bowel syndrome (564 1) (K58 9)   7  Need for influenza vaccination (V04 81) (Z23)   8  Screening for breast cancer (V76 10) (Z12 39)   9  Spider 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 9) (I83 93)   10  Vitamin D deficiency (268 9) (E55 9)   11   Well adult on routine health check (V70 0) (Z00 00)    Past Medical History    · History of Acute bacterial conjunctivitis of left eye (372 03) (H10 32)   · History of Acute maxillary sinusitis (461 0) (J01 00)   · History of Fibromyalgia (729 1) (M79 7)   · History of migraine (V12 49) (Z86 69)   · History of Other muscle spasm (728 85) (L77 443)   · History of Perioral dermatitis (695 3) (L71 0)    Surgical History    · History of Colonoscopy (Fiberoptic)   · History of Dilation And Curettage   · History of Drainage Of Ovarian Cyst(S)   · History of Hysterectomy   · History of Hysteroscopy With Endometrial Ablation   · History of Tonsillectomy    Family History  Mother    · Family history of Essential Hypertension   · Family history of Type 2 Diabetes Mellitus  Father    · Family history of Melanoma  Family History    · Family history of Asthma   · Family history of Depression    Social History    · Drinks coffee   · Never A Smoker   · No alcohol use   · Non-smoker (V49 89) (Z78 9)    Current Meds   1  Nadolol 20 MG Oral Tablet; TAKE 1 TABLET DAILY; Therapy: 07XYF1108 to (Evaluate:10Mar2018)  Requested for: 23ESM9274; Last   Rx:15Mar2017 Ordered   2  Womens Multi Oral Capsule; TAKE AS DIRECTED; Therapy: 57IOS4382 to Recorded    Allergies    1  Erythromycin Base TABS    2  No Known Food Allergies   3  Seasonal    Vitals   Recorded: 28VRZ4369 01:27PM Recorded: 96ZGN8682 01:08PM   Heart Rate  60   Respiration  16   Systolic  429   Diastolic  68   Height 5 ft 4 68 in    Weight  133 lb 8 oz   BMI Calculated 22 43 22 41   BSA Calculated 1 66 1 66     Physical Exam    Constitutional   General appearance: No acute distress, well appearing and well nourished  Head and Face   Head and face: Normal     Eyes   Conjunctiva and lids: No swelling, erythema or discharge  Pupils and irises: Equal, round, reactive to light  Ears, Nose, Mouth, and Throat   External inspection of ears and nose: Normal     Otoscopic examination: Tympanic membranes translucent with normal light reflex  Canals patent without erythema  Hearing: Normal     Nasal mucosa, septum, and turbinates: Normal without edema or erythema  Lips, teeth, and gums: Normal, good dentition  Oropharynx: Normal with no erythema, edema, exudate or lesions  Neck   Neck: Supple, symmetric, trachea midline, no masses  Thyroid: Normal, no thyromegaly  Pulmonary   Respiratory effort: No increased work of breathing or signs of respiratory distress  Auscultation of lungs: Clear to auscultation  Cardiovascular   Auscultation of heart: Normal rate and rhythm, normal S1 and S2, no murmurs      Examination of extremities for edema and/or varicosities: Normal     Abdomen   Abdomen: Non-tender, no masses  Liver and spleen: No hepatomegaly or splenomegaly  Lymphatic   Palpation of lymph nodes in neck: No lymphadenopathy  Palpation of lymph nodes in axillae: No lymphadenopathy  Musculoskeletal   Gait and station: Normal     Digits and nails: Normal without clubbing or cyanosis  Joints, bones, and muscles: Normal     Range of motion: Normal     Stability: Normal     Muscle strength/tone: Normal     Skin   Skin and subcutaneous tissue: Normal without rashes or lesions  Palpation of skin and subcutaneous tissue: Normal turgor  Neurologic   Cranial nerves: Cranial nerves II-XII intact  Cortical function: Normal mental status  Reflexes: 2+ and symmetric  Sensation: No sensory loss  Coordination: Normal finger to nose and heel to shin  Psychiatric   Judgment and insight: Normal     Orientation to person, place, and time: Normal     Recent and remote memory: Intact  Mood and affect: Normal        Results/Data  PHQ-9 Adult Depression Screening 06Apr2017 01:12PM User, [employer]     Test Name Result Flag Reference   PHQ-9 Adult Depression Score 0     Over the last two weeks, how often have you been bothered by any of the following problems? Little interest or pleasure in doing things: Not at all - 0  Feeling down, depressed, or hopeless: Not at all - 0  Trouble falling or staying asleep, or sleeping too much: Not at all - 0  Feeling tired or having little energy: Not at all - 0  Poor appetite or over eating: Not at all - 0  Feeling bad about yourself - or that you are a failure or have let yourself or your family down: Not at all - 0  Trouble concentrating on things, such as reading the newspaper or watching television: Not at all - 0  Moving or speaking so slowly that other people could have noticed   Or the opposite -  being so fidgety or restless that you have been moving around a lot more than usual: Not at all - 0  Thoughts that you would be better off dead, or of hurting yourself in some way: Not at all - 0   PHQ-9 Adult Depression Screening Negative     PHQ-9 Difficulty Level Not difficult at all     PHQ-9 Severity No Depression         Future Appointments    Date/Time Provider Specialty Site   10/09/2017 01:30 PM Юлия Patel DO Family Medicine [address] [facility]     Signatures   Electronically signed by : Ke Salamanca DO;  Apr 6 2017  1:43PM EST                       (Author)

## 2018-01-11 NOTE — PROGRESS NOTES
Assessment    1  Spider 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 9) (I11 04)    Plan    1  Gradient compression stocking, waist length, 20-30 mm Hg, each; Status:Complete;     Done: 95FLR2399   2  Sclero Spider Veins - POC; Status:Active - Perform Order; Requested KPD:70IUM2369;     Discussion/Summary  Discussion Summary:   79-year-old female comes in for evaluation of her spider veins area  Mainly cosmetic concerns of the posterior leg bilaterally  She has no lower extremity swelling or stasis changes    -We discussed injection sclerotherapy and I've given her a packet for her to read over  We discussed the risk of a permanent hyperpigmentation   -This is an out of pocket expense not covered by insurance  I suspect she would need 1-2 treatments for optimal results  Initial session is $500    -She will need full pantyhose compression  Rx given  -She would like to think about injections and will call the office to schedule if she decides to do so  She will need photos taken at her next visit and would plan on foam injections with 0 5% Asclera solution  Counseling Documentation With Imm: The patient was counseled regarding impressions, risks and benefits of treatment options  Chief Complaint  Chief Complaint Free Text Note Form: "spider veins"      History of Present Illness  Varicose Veins [facility] Vascular: The patient is being seen for an initial evaluation of varicose veins  Referred by: Dr Liliam Bailey  The patient is currently asymptomatic  These symptoms developed 15-16 year(s) ago  This patient has no history of DVT, pulmonary embolism, superficial venous thrombosis, or a hypercoagulable state  Evaluation and Treatment History: This patient has had no prior surgical treatment of the venous system  This patient is not currently utilizing any conservative management strategies to manage the current symptoms     Imaging: Prior venous imaging with a lower extremity venous duplex to assess for reflux has not been performed  Free Text HPI: 58-year-old female comes in for evaluation of her spider veins  Spider veins present since after her pregnancy with her son 15 years ago  She denies having a aching fatigue in the legs on a daily basis  She says she has minimal swelling of the ankle level though this is the imprint of her sock and there is no edema  She has no stasis changes  She denies any history of deep or superficial venous thrombosis  She has family history of varicose veins  She maintains a normal weight  Review of Systems  Complete Female - Vasc:   Constitutional: No fever or chills, feels well, no tiredness, no recent weight gain or weight loss  Eyes: no sudden vision loss, no blurred vision and no double vision, but no eye pain, no eyesight problems, no dryness of the eyes, eyes not red, no purulent discharge from the eyes, no itching of the eyes and wears glasses  ENT: no loss of hearing, no nosebleeds, no hoarseness  Cardiovascular: no chest pain, regular heart rate  Respiratory: No sob, no wheezing, no cough, no sob with exertion, no orthopnea  Gastrointestinal: No nausea, No vomiting, no diarrhea, no blood in stool  Genitourinary: no dysuria, no Hematuria,no urinary incontinence  Musculoskeletal: no limb pain, no limb swelling  Integumentary: no rash, no lesions, no wounds, no ulcer  Neurological: headache and no dementia, but no numbness, no confusion, no dizziness, no limb weakness, no convulsions, no fainting and no difficulty walking  Psychiatric: no depression, no mood disorders, no anxiety  Hematologic/Lymphatic: a tendency for easy bruising and swollen glands in the neck, but no swollen glands and no tendency for easy bleeding  ROS Reviewed:   ROS reviewed  Active Problems    1  Acute maxillary sinusitis (461 0) (J01 00)   2  Benign essential hypertension (401 1) (I10)   3  Bone Cyst (733 20)   4  GERD (gastroesophageal reflux disease) (530 81) (K21 9)   5  Headache, migraine (346 90) (G43 909)   6  History of hysterectomy with oophorectomy (V88 01) (Z90 710,Z90 721)   7  Irritable bowel syndrome (564 1) (K58 9)   8  Need for influenza vaccination (V04 81) (Z23)   9  Other muscle spasm (728 85) (M62 838)   10  Screening for breast cancer (V76 10) (Z12 39)   11  Vitamin D deficiency (268 9) (E55 9)   12  Well adult on routine health check (V70 0) (Z00 00)    Past Medical History    1  History of Acute bacterial conjunctivitis of left eye (372 03) (H10 32)   2  History of Fibromyalgia (729 1) (M79 7)   3  History of migraine (V12 49) (Z86 69)  Active Problems And Past Medical History Reviewed: The active problems and past medical history were reviewed and updated today  Surgical History  Surgical History Reviewed: The surgical history was reviewed and updated today  Family History  Mother    1  Family history of Essential Hypertension   2  Family history of Type 2 Diabetes Mellitus  Father    3  Family history of Melanoma  Family History    4  Family history of Asthma   5  Family history of Depression  Family History Reviewed: The family history was reviewed and updated today  Social History    · Drinks coffee   · Never A Smoker   · No alcohol use   · Non-smoker (V49 89) (Z78 9)  Social History Reviewed: The social history was reviewed and updated today  Current Meds   1  Nadolol 20 MG Oral Tablet; TAKE 1 TABLET DAILY; Therapy: 42AEV5938 to (Evaluate:08Apr2017)  Requested for: 13Apr2016; Last   Rx:13Apr2016 Ordered   2  Womens Multi Oral Capsule; TAKE AS DIRECTED; Therapy: 44QCN9911 to Recorded  Medication List Reviewed: The medication list was reviewed and updated today  Allergies    1  Erythromycin Base TABS    2  No Known Food Allergies   3   Seasonal    Vitals  Vital Signs    Recorded: 01GWV9976 10:19AM   Heart Rate 72, L Radial   Pulse Quality Normal, L Radial   Respiration Quality Normal   Respiration 16   Systolic 929, LUE, Sitting   Diastolic 70, LUE, Sitting   Height 5 ft 4 72 in   Weight 130 lb    BMI Calculated 21 82   BSA Calculated 1 64     Physical Exam    Posterior tibialis: right 2+ and left 2+  Dorsalis pedis: right 2+ and left 2+  Distal Pulse Exam: Normal Capillary Refill  Extremities: No upper or lower extremity edema  LE Varicose Veins: right leg spider veins and left leg spider veins  The heart rate was normal  The rhythm was regular  Heart sounds: normal S1 and normal S2    Murmurs: No murmurs were heard  Pulmonary   Respiratory effort: No increased work of breathing or signs of respiratory distress  Auscultation of lungs: Clear to auscultation  No wheezing, no rales, no rhonchi  Abdomen   Abdomen: Abdomen soft, non-tender, no masses, non distended, no rebound tenderness  Psychiatric   Orientation to person, place and time: Normal    Mood and affect: Normal    Neurologic Sensory exam normal   Motor skills intact  Musculoskeletal   Gait and station: Normal    Skin   Skin and subcutaneous tissue: Normal without rashes or lesions  Palpation of skin and subcutaneous tissue: Normal turgor     Venous Disease: No lipodermatosclerosis, stasis dermatitis, hyperpigmentation, or atrophie karlie noted on exam       Future Appointments    Date/Time Provider Specialty Site   04/06/2017 01:00 PM Jenifer Echeverria DO Family Medicine [facility]     Signatures   Electronically signed by : Dulce Erwin; Jan 13 2017 10:59AM EST                       (Author)    Electronically signed by : Edie Tyler MD; Jan 24 2017 11:15AM EST

## 2018-01-11 NOTE — RESULT NOTES
Discussion/Summary   LUPUS TEST NEGATIVE     [facility]     Verified Results  (1) ELVIRA SCREEN W/REFLEX TO TITER/PATTERN [id number] 12:34PM Nicholas Gonzales Order Number: [id number]     Test Name Result Flag Reference   ELVIRA SCREEN   Negative  Negative       Signatures   Electronically signed by : Gamaliel Arias DO; Jun 14 2017  3:24PM EST                       (Author)

## 2018-01-12 VITALS
TEMPERATURE: 97.2 F | HEIGHT: 65 IN | RESPIRATION RATE: 16 BRPM | WEIGHT: 136 LBS | HEART RATE: 64 BPM | BODY MASS INDEX: 22.66 KG/M2 | SYSTOLIC BLOOD PRESSURE: 120 MMHG | DIASTOLIC BLOOD PRESSURE: 78 MMHG

## 2018-01-12 NOTE — RESULT NOTES
Discussion/Summary   RHEUMATOID NEGATIVE      [facility]     Verified Results  (1) RHEUMATOID FACTOR SCREEN [id number] 02:05PM Nicholas Gonzales Order Number: [id number]     Test Name Result Flag Reference   RHEUMATOID FACTOR Negative  Negative

## 2018-01-13 VITALS
BODY MASS INDEX: 21.66 KG/M2 | DIASTOLIC BLOOD PRESSURE: 70 MMHG | RESPIRATION RATE: 16 BRPM | HEART RATE: 72 BPM | SYSTOLIC BLOOD PRESSURE: 110 MMHG | WEIGHT: 130 LBS | HEIGHT: 65 IN

## 2018-01-13 VITALS
HEIGHT: 65 IN | BODY MASS INDEX: 22.74 KG/M2 | WEIGHT: 136.5 LBS | DIASTOLIC BLOOD PRESSURE: 88 MMHG | HEART RATE: 64 BPM | RESPIRATION RATE: 16 BRPM | SYSTOLIC BLOOD PRESSURE: 126 MMHG

## 2018-01-14 VITALS
HEART RATE: 68 BPM | HEIGHT: 65 IN | RESPIRATION RATE: 16 BRPM | SYSTOLIC BLOOD PRESSURE: 120 MMHG | BODY MASS INDEX: 22.2 KG/M2 | WEIGHT: 133.25 LBS | DIASTOLIC BLOOD PRESSURE: 74 MMHG

## 2018-01-14 VITALS
BODY MASS INDEX: 21.83 KG/M2 | WEIGHT: 131 LBS | DIASTOLIC BLOOD PRESSURE: 72 MMHG | SYSTOLIC BLOOD PRESSURE: 106 MMHG | RESPIRATION RATE: 18 BRPM | HEIGHT: 65 IN | HEART RATE: 72 BPM

## 2018-01-14 VITALS
HEIGHT: 65 IN | DIASTOLIC BLOOD PRESSURE: 80 MMHG | HEART RATE: 72 BPM | BODY MASS INDEX: 22.39 KG/M2 | WEIGHT: 134.4 LBS | RESPIRATION RATE: 16 BRPM | SYSTOLIC BLOOD PRESSURE: 102 MMHG

## 2018-01-15 VITALS
HEART RATE: 60 BPM | BODY MASS INDEX: 22.24 KG/M2 | HEIGHT: 65 IN | RESPIRATION RATE: 16 BRPM | SYSTOLIC BLOOD PRESSURE: 110 MMHG | WEIGHT: 133.5 LBS | DIASTOLIC BLOOD PRESSURE: 68 MMHG

## 2018-01-16 ENCOUNTER — GENERIC CONVERSION - ENCOUNTER (OUTPATIENT)
Dept: FAMILY MEDICINE CLINIC | Facility: CLINIC | Age: 43
End: 2018-01-16

## 2018-01-16 NOTE — PROGRESS NOTES
Assessment    1  Well adult on routine health check (V70 0) (Z00 00)   2  Migraine headache (346 90) (G43 909)    Plan  Benign essential hypertension, Vitamin D deficiency    · (1) COMPREHENSIVE METABOLIC PANEL; Status:Active; Requested for:29Mar2016;    · (1) LIPID PANEL FASTING W DIRECT LDL REFLEX; Status:Active; Requested  for:29Mar2016;    · (1) TSH WITH FT4 REFLEX; Status:Active; Requested for:29Mar2016;    · (1) VITAMIN D 25-HYDROXY; Status:Active; Requested for:29Mar2016;     Discussion/Summary  health maintenance visit Currently, she eats a healthy diet and has an adequate exercise regimen  cervical cancer screening is current Breast cancer screening: mammogram is current  Chief Complaint  Patient here as a new patient for Annual Wellness exam      History of Present Illness  HM, Adult Female: The patient is being seen for a health maintenance evaluation  General Health: The patient's health since the last visit is described as good  She has regular dental visits  She denies vision problems  Vision care includes wearing glasses  She denies hearing loss  Lifestyle:  She consumes a diverse and healthy diet  She does not have any weight concerns  She exercises regularly  She does not use tobacco  She denies alcohol use  She denies drug use  Reproductive health: the patient is postmenopausal   she reports normal menses  she uses no contraception  pregnancy history: G 2P 2  Screening: cancer screening reviewed and updated  Breast cancer screening includes a mammogram performed last year  Colorectal cancer screening includes a colonoscopy performed within the past ten years  ([address])  metabolic screening reviewed and updated  Metabolic screening includes lipid profile performed within the past five years, glucose screening performed last year and thyroid function test performed last year        Review of Systems    Constitutional: No fever, no chills, feels well, no tiredness, no recent weight gain or weight loss  Eyes: No complaints of eye pain, no red eyes, no eyesight problems, no discharge, no dry eyes, no itching of eyes  ENT: no complaints of earache, no loss of hearing, no nose bleeds, no nasal discharge, no sore throat, no hoarseness  Cardiovascular: No complaints of slow heart rate, no fast heart rate, no chest pain, no palpitations, no leg claudication, no lower extremity edema  Respiratory: No complaints of shortness of breath, no wheezing, no cough, no SOB on exertion, no orthopnea, no PND  Gastrointestinal: No complaints of abdominal pain, no constipation, no nausea or vomiting, no diarrhea, no bloody stools  Genitourinary: No complaints of dysuria, no incontinence, no pelvic pain, no dysmenorrhea, no vaginal discharge or bleeding  Musculoskeletal: No complaints of arthralgias, no myalgias, no joint swelling or stiffness, no limb pain or swelling  Integumentary: No complaints of skin rash or lesions, no itching, no skin wounds, no breast pain or lump  Neurological: No complaints of headache, no confusion, no convulsions, no numbness, no dizziness or fainting, no tingling, no limb weakness, no difficulty walking  Psychiatric: Not suicidal, no sleep disturbance, no anxiety or depression, no change in personality, no emotional problems  Endocrine: No complaints of proptosis, no hot flashes, no muscle weakness, no deepening of the voice, no feelings of weakness  Hematologic/Lymphatic: No complaints of swollen glands, no swollen glands in the neck, does not bleed easily, does not bruise easily  Active Problems    1  Benign essential hypertension (401 1) (I10)   2  Bone Cyst (733 20)   3  GERD (gastroesophageal reflux disease) (530 81) (K21 9)   4  History of hysterectomy with oophorectomy (V88 01) (Z90 710,Z90 721)   5  Irritable bowel syndrome (564 1) (K58 9)   6  Other muscle spasm (728 85) (M62 838)   7   Screening for breast cancer (V76 10) (Z12 39) 8  Vitamin D deficiency (268 9) (E55 9)    Past Medical History    · History of Fibromyalgia (729 1) (M79 7)   · History of migraine (V12 49) (Z86 69)    Surgical History    · History of Colonoscopy (Fiberoptic)   · History of Dilation And Curettage   · History of Drainage Of Ovarian Cyst(S)   · History of Hysterectomy   · History of Hysteroscopy With Endometrial Ablation   · History of Tonsillectomy    Family History    · Family history of Essential Hypertension   · Family history of Type 2 Diabetes Mellitus    · Family history of Melanoma    · Family history of Asthma   · Family history of Depression    Social History    · Drinks coffee   · Never A Smoker   · No alcohol use   · Non-smoker (V49 89) (Z78 9)    Current Meds   1  Acidophilus Lactobacillus Powder; USE AS DIRECTED; Therapy: 16TAR3901 to Recorded   2  Nadolol 20 MG Oral Tablet; TAKE 1 TABLET DAILY; Therapy: 56ZQC7371 to (Garret Wilkes)  Requested for: 02Apr2015; Last   Rx:50Mpl9552 Ordered    Allergies    1  Erythromycin Base TABS    2  No Known Food Allergies   3  Seasonal    Vitals   Recorded: 78JLH9320 03:53PM   Heart Rate 64   Respiration 14   Systolic 295   Diastolic 70   Height 5 ft 4 72 in   Weight 127 lb 4 oz   BMI Calculated 21 36   BSA Calculated 1 63     Physical Exam    Constitutional   General appearance: No acute distress, well appearing and well nourished  Head and Face   Head and face: Normal     Eyes   Conjunctiva and lids: No swelling, erythema or discharge  Pupils and irises: Equal, round, reactive to light  Ears, Nose, Mouth, and Throat   External inspection of ears and nose: Normal     Otoscopic examination: Tympanic membranes translucent with normal light reflex  Canals patent without erythema  Hearing: Normal     Nasal mucosa, septum, and turbinates: Normal without edema or erythema  Lips, teeth, and gums: Normal, good dentition  Oropharynx: Normal with no erythema, edema, exudate or lesions      Neck Neck: Supple, symmetric, trachea midline, no masses  Thyroid: Normal, no thyromegaly  Pulmonary   Respiratory effort: No increased work of breathing or signs of respiratory distress  Auscultation of lungs: Clear to auscultation  Cardiovascular   Auscultation of heart: Normal rate and rhythm, normal S1 and S2, no murmurs  Examination of extremities for edema and/or varicosities: Normal     Abdomen   Abdomen: Non-tender, no masses  Liver and spleen: No hepatomegaly or splenomegaly  Lymphatic   Palpation of lymph nodes in neck: No lymphadenopathy  Palpation of lymph nodes in axillae: No lymphadenopathy  Palpation of lymph nodes in groin: No lymphadenopathy  Musculoskeletal   Gait and station: Normal     Digits and nails: Normal without clubbing or cyanosis  Joints, bones, and muscles: Normal     Range of motion: Normal     Stability: Normal     Muscle strength/tone: Normal     Skin   Skin and subcutaneous tissue: Normal without rashes or lesions  Palpation of skin and subcutaneous tissue: Normal turgor  Neurologic   Cranial nerves: Cranial nerves II-XII intact  Cortical function: Normal mental status  Reflexes: 2+ and symmetric  Sensation: No sensory loss  Coordination: Normal finger to nose and heel to shin  Psychiatric   Judgment and insight: Normal     Orientation to person, place, and time: Normal     Recent and remote memory: Intact      Mood and affect: Normal        Results/Data  Health Maintenance Flow Sheet [id number] 03:51PM      Test Name Result Flag Reference   Mammography Negative       Summary / No summary entered :      No summary entered  Documents attached :      [address] [facility], Soledad Hendrickson [facility]; Enc: [id number] - Image Encounter - Primus Breed -      (Family Medicine) (Additional Information Document)    Signatures   Electronically signed by : Alexa Guido DO; Mar 29 2016  4:11PM EST                       (Author)

## 2018-01-16 NOTE — RESULT NOTES
Discussion/Summary   SO FAR ALL TESTS ARE NEGATIVE  Nova Sears     Verified Results  (1) HLA B27 [id number] 02:05PM Jay Grossman Order Number: [id number]     Test Name Result Flag Reference   HLA B27 Negative     HLA-B*27 Negative  B27 allele interpretation for all loci based on IMGT/HLA  database version 3 25  This test was developed and its performance characteristics  determined by [facility]   It has not been cleared or approved  by the Food and Drug Administration  [facility] CLIA ID Number [id number]  This test was performed using PCR (Polymerase Chain Reaction)/SSOP  (Sequence Specific Oligonucleotide Probes) technique  SBT (Sequence  Based Typing) and/or SSP (Sequence Specific Primers) may be used as  supplemental methods when necessary  Please contact HLA Customer  Service at [phone] if you have any questions     Director of [facility]   Dr Benitez Arnold, PhD    Performed at:  [address]  [id number]  : Corky Harris PhD, Phone:  [phone]

## 2018-01-17 NOTE — MISCELLANEOUS
Message  Return to work or school:   Bhargavi Sher is under my professional care  She was seen in my office on 7/11/17     She is able to perform activities of daily living without limitations  She was unable to work from [facility] 3-7,2017, [facility] to return on July 10,2017          Future Appointments    Signatures   Electronically signed by : Norm Hays DO; Jul 11 2017  1:44PM EST                       (Author)

## 2018-01-17 NOTE — RESULT NOTES
Discussion/Summary   NORMAL VITAMIN D   DR Kami Carter     Verified Results  (1) VITAMIN D 25-HYDROXY [id number] 01:55PM Janis Daniels Order Number: [id number]     Test Name Result Flag Reference   VIT D 25-HYDROX 51 0 ng/mL  30 0-100 0   This assay is a certified procedure of the CDC Vitamin D Standardization Certification Program (VDSCP)     Deficiency <20ng/ml   Insufficiency 20-30ng/ml   Sufficient  ng/ml     *Patients undergoing fluorescein dye angiography may retain small amounts of fluorescein in the body for 48-72 hours post procedure  Samples containing fluorescein can produce falsely elevated Vitamin D values  If the patient had this procedure, a specimen should be resubmitted post fluorescein clearance

## 2018-01-17 NOTE — MISCELLANEOUS
Assessment    1  Hospital discharge follow-up (V67 59) (Z09)   2  Gastroenteritis (558 9) (K52 9)   3  Abdominal pain, epigastric (789 06) (R10 13)   4  Benign essential hypertension (401 1) (I10)    Chief Complaint  Chief Complaint Free Text Note Form: Patient here for MARYAM ARNOLD from [facility] 07/03/17 discharged abdominal pain      History of Present Illness  TCM Communication [facility]: The patient is being contacted for follow-up after hospitalization and [facility] records were reviewed  She was hospitalized at [facility]  The date of admission: 07/02/2017, date of discharge: 07/03/2017  Diagnosis: ABDOMINAL PAIN, gasteroenteritis  She was discharged to home  She scheduled a follow up appointment  The patient is currently asymptomatic  Counseling was provided to the patient  Topics counseled included diagnostic results and prognosis  Communication performed and completed by Ashly Dsouza   HPI: started suddenly with abd pain and vomiting/diarrhea  was admitted overnight  symptoms resolved by friday   Gastroenteritis: The patient is being seen for follow-up of a hospitalization for gastroenteritis  Symptoms:  abdominal pain  The patient is currently experiencing symptoms  No associated symptoms are reported  Hypertension (Follow-Up): The patient presents for follow-up of essential hypertension  The patient states she has been doing well with her blood pressure control since the last visit  She has no significant interval events  Symptoms: The patient is currently asymptomatic  Medications: the patient is adherent with her medication regimen  Review of Systems  Complete-Female:   Constitutional: No fever, no chills, feels well, no tiredness, no recent weight gain or weight loss  Eyes: No complaints of eye pain, no red eyes, no eyesight problems, no discharge, no dry eyes, no itching of eyes     ENT: no complaints of earache, no loss of hearing, no nose bleeds, no nasal discharge, no sore throat, no hoarseness  Cardiovascular: No complaints of slow heart rate, no fast heart rate, no chest pain, no palpitations, no leg claudication, no lower extremity edema  Respiratory: No complaints of shortness of breath, no wheezing, no cough, no SOB on exertion, no orthopnea, no PND  Gastrointestinal: as noted in HPI  Genitourinary: No complaints of dysuria, no incontinence, no pelvic pain, no dysmenorrhea, no vaginal discharge or bleeding  Musculoskeletal: No complaints of arthralgias, no myalgias, no joint swelling or stiffness, no limb pain or swelling  Integumentary: No complaints of skin rash or lesions, no itching, no skin wounds, no breast pain or lump  Neurological: No complaints of headache, no confusion, no convulsions, no numbness, no dizziness or fainting, no tingling, no limb weakness, no difficulty walking  Psychiatric: Not suicidal, no sleep disturbance, no anxiety or depression, no change in personality, no emotional problems  Endocrine: No complaints of proptosis, no hot flashes, no muscle weakness, no deepening of the voice, no feelings of weakness  Hematologic/Lymphatic: No complaints of swollen glands, no swollen glands in the neck, does not bleed easily, does not bruise easily  Active Problems    1  Benign essential hypertension (401 1) (I10)   2  Bone Cyst (733 20)   3  GERD (gastroesophageal reflux disease) (530 81) (K21 9)   4  Headache, migraine (346 90) (G43 909)   5  History of hysterectomy with oophorectomy (V88 01) (Z90 710,Z90 721)   6  Irritable bowel syndrome (564 1) (K58 9)   7  Need for influenza vaccination (V04 81) (Z23)   8  Screening for breast cancer (V76 10) (Z12 39)   9  Spider 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 9) (I83 93)   10  Vitamin D deficiency (268 9) (E55 9)   11  Well adult on routine health check (V70 0) (Z00 00)    Past Medical History    1  History of Acute bacterial conjunctivitis of left eye (372 03) (H10 32)   2   History of Acute maxillary sinusitis (461 0) (J01 00)   3  History of Fibromyalgia (729 1) (M79 7)   4  History of migraine (V12 49) (Z86 69)   5  History of Other muscle spasm (728 85) (M62 838)   6  History of Perioral dermatitis (695 3) (L71 0)    Surgical History    1  History of Colonoscopy (Fiberoptic)   2  History of Dilation And Curettage   3  History of Drainage Of Ovarian Cyst(S)   4  History of Hysterectomy   5  History of Hysteroscopy With Endometrial Ablation   6  History of Tonsillectomy    Family History  Mother    1  Family history of Essential Hypertension   2  Family history of Type 2 Diabetes Mellitus  Father    3  Family history of Melanoma  Family History    4  Family history of Asthma   5  Family history of Depression    Social History    · Drinks coffee   · Never A Smoker   · No alcohol use   · Non-smoker (V49 89) (Z78 9)    Current Meds   1  Nadolol 20 MG Oral Tablet; TAKE 1 TABLET DAILY; Therapy: 46PDT2964 to (Evaluate:10Mar2018)  Requested for: 21GAK3395; Last   Rx:15Mar2017 Ordered   2  Pepcid 40 MG Oral Tablet (Famotidine); take 1 tablet every twelve hours; Therapy: 35NHU1785 to ([phone]) Recorded   3  Womens Multi Oral Capsule; TAKE AS DIRECTED; Therapy: 67RCU8130 to Recorded    Allergies    1  Erythromycin Base TABS    2  No Known Food Allergies   3  Seasonal    Physical Exam    Constitutional   General appearance: No acute distress, well appearing and well nourished  Pulmonary   Respiratory effort: No increased work of breathing or signs of respiratory distress  Auscultation of lungs: Clear to auscultation  Cardiovascular   Auscultation of heart: Normal rate and rhythm, normal S1 and S2, without murmurs  Examination of extremities for edema and/or varicosities: Normal     Abdomen   Abdomen: Non-tender, no masses  Liver and spleen: No hepatomegaly or splenomegaly  Lymphatic   Palpation of lymph nodes in neck: No lymphadenopathy           Message  Return to work or school:   Clint Rust is under my professional care  She was seen in my office on 7/11/17     She is able to perform activities of daily living without limitations  She was unable to work from [facility] 3-7,2017, [facility] to return on July 10,2017          Future Appointments    Date/Time Provider Specialty Site   10/09/2017 01:30 PM Price Simons DO Family Medicine [facility]     Signatures   Electronically signed by : Dianna Del Valle DO; Jul 11 2017  1:44PM EST                       (Author)

## 2018-01-18 NOTE — RESULT NOTES
Discussion/Summary   NORMAL LYME   DR Pat Perry     Verified Results  (1) LYME ANTIBODY PROFILE W/REFLEX TO WESTERN BLOT [id number] 02:05PM Rakesh [place] Order Number: [id number]     Test Name Result Flag Reference   LYME IGG 0 23  0 00-0 79   NEGATIVE(0 00-0 79)-Absence of detectable Borrelia IgG Antibodies  A negative result does not exclude the possibility of Borrelia infection  If early Lyme disease is suspected,a second sample should be collected & tested 4 weeks after initial testing  LYME IGM 0 23  0 00-0 79   NEGATIVE (0 00-0 79)-Absence of detectable Borrelia IgM antibodies  A negative result does not exclude the possibility of Borrelia infection  If early lyme disease is suspected, a second sample should be collected & tested 4 weeks after initial testing

## 2018-01-23 VITALS
DIASTOLIC BLOOD PRESSURE: 80 MMHG | RESPIRATION RATE: 18 BRPM | SYSTOLIC BLOOD PRESSURE: 122 MMHG | HEART RATE: 66 BPM | HEIGHT: 65 IN | BODY MASS INDEX: 22.39 KG/M2 | WEIGHT: 134.38 LBS

## 2018-01-23 NOTE — RESULT NOTES
Verified Results  * NM HEPATOBILIARY Kevyn Contreras [id number] 11:12AM Daune Colder     Test Name Result Flag Reference   NM HEPATOBILIARY W RX (Report)     HEPATOBILIARY SCAN WITH CHOLECYSTOKININ ADMINISTRATION      INDICATION: R10 9: Unspecified abdominal pain   G89 29: Other chronic pain  History taken directly from the electronic ordering system  COMPARISON: CT and ultrasound from 7/3/2017     TECHNIQUE:  Following the intravenous administration of 5 38 mCi Tc-99m labeled mebrofenin, dynamic abdominal images were obtained over a 60 minute time period  Images were performed in AP projection  FINDINGS:      There is prompt, uniform accumulation with normal clearance of the radiopharmaceutical by the liver  There is normal filling of the intrahepatic ducts, common bile duct and gallbladder with normal excretion of the radiopharmaceutical into the duodenum  In order to evaluate the contractile response of the gallbladder to cholecystokinin stimulation, 1 2 mcg sincalide (0 02 mcg/kg) was administered by slow intravenous infusion over 60 minutes  These images demonstrate normal contraction of the    gallbladder  The calculated gallbladder ejection fraction is 40 % (N = >35%)  IMPRESSION:     1  Normal hepatobiliary study  2  Normal contractile response of the gallbladder to cholecystokinin infusion   (Gallbladder EF of 40%)       Workstation performed: [id number]     Signed by:   Rohini Jones MD   1/8/18

## 2018-01-30 DIAGNOSIS — M25.50 ARTHRALGIA, UNSPECIFIED JOINT: Primary | ICD-10-CM

## 2018-04-10 ENCOUNTER — OFFICE VISIT (OUTPATIENT)
Dept: FAMILY MEDICINE CLINIC | Facility: CLINIC | Age: 43
End: 2018-04-10
Payer: COMMERCIAL

## 2018-04-10 VITALS
BODY MASS INDEX: 23.32 KG/M2 | SYSTOLIC BLOOD PRESSURE: 120 MMHG | RESPIRATION RATE: 16 BRPM | WEIGHT: 140 LBS | DIASTOLIC BLOOD PRESSURE: 82 MMHG | HEART RATE: 72 BPM | HEIGHT: 65 IN

## 2018-04-10 DIAGNOSIS — I10 BENIGN ESSENTIAL HYPERTENSION: ICD-10-CM

## 2018-04-10 DIAGNOSIS — E55.9 VITAMIN D DEFICIENCY: ICD-10-CM

## 2018-04-10 DIAGNOSIS — Z00.00 WELL ADULT EXAM: Primary | ICD-10-CM

## 2018-04-10 DIAGNOSIS — I10 ESSENTIAL HYPERTENSION: Primary | ICD-10-CM

## 2018-04-10 DIAGNOSIS — G43.909 MIGRAINE WITHOUT STATUS MIGRAINOSUS, NOT INTRACTABLE, UNSPECIFIED MIGRAINE TYPE: ICD-10-CM

## 2018-04-10 PROBLEM — K52.9 GASTROENTERITIS: Status: RESOLVED | Noted: 2017-07-03 | Resolved: 2018-04-10

## 2018-04-10 PROBLEM — I83.93 SPIDER VEINS OF BOTH LOWER EXTREMITIES: Status: ACTIVE | Noted: 2017-01-13

## 2018-04-10 PROBLEM — IMO0002 POISONING: Status: RESOLVED | Noted: 2017-12-28 | Resolved: 2018-04-10

## 2018-04-10 PROBLEM — M25.50 MULTIPLE JOINT PAIN: Status: ACTIVE | Noted: 2017-10-09

## 2018-04-10 PROBLEM — IMO0002 POISONING: Status: ACTIVE | Noted: 2017-12-28

## 2018-04-10 PROBLEM — A41.9 SEPSIS (HCC): Status: RESOLVED | Noted: 2017-07-03 | Resolved: 2018-04-10

## 2018-04-10 PROBLEM — R10.9 ABDOMINAL PAIN: Status: RESOLVED | Noted: 2017-07-03 | Resolved: 2018-04-10

## 2018-04-10 PROCEDURE — 99396 PREV VISIT EST AGE 40-64: CPT | Performed by: FAMILY MEDICINE

## 2018-04-10 RX ORDER — NADOLOL 20 MG/1
20 TABLET ORAL DAILY
Qty: 90 TABLET | Refills: 3 | Status: SHIPPED | OUTPATIENT
Start: 2018-04-10 | End: 2019-03-25 | Stop reason: SDUPTHER

## 2018-04-10 RX ORDER — NADOLOL 20 MG/1
1 TABLET ORAL DAILY
COMMUNITY
Start: 2014-10-29 | End: 2018-04-10 | Stop reason: SDUPTHER

## 2018-04-10 NOTE — PATIENT INSTRUCTIONS

## 2018-04-10 NOTE — PROGRESS NOTES
Assessment/Plan:         Diagnoses and all orders for this visit:    Well adult exam  Comments:  up to date with health maintanence    Benign essential hypertension  -     Comprehensive metabolic panel; Future  -     Lipid Panel with Direct LDL reflex; Future  -     TSH, 3rd generation with T4 reflex; Future    Vitamin D deficiency  -     Vitamin D 25 hydroxy; Future    Other orders  -     nadolol (CORGARD) 20 mg tablet; Take 1 tablet by mouth daily  -     Multiple Vitamins-Minerals (WOMENS MULTI PO); Take by mouth          Subjective:      Patient ID: Luzma Kuhn is a 36 y o  female  Here for wellness  Has seen allergist- jolene- allergies to mold and feather  Has apt with rheum next Tuesday  Well Adult Physical   Patient here for a comprehensive physical exam The patient reports problems - joint achey    Do you take any herbs or supplements that were not prescribed by a doctor? no   Are you taking calcium supplements? no   Are you taking aspirin daily? no     History:  LMP: No LMP recorded  Patient has had a hysterectomy  Menopause at 2018, left ovary and fallopian tube- hot flashes have increase  Last pap date: seen gyn but no pap- Dr Phan Elder  Abnormal pap? no  : 2  Para: 2    Mammogram 2018  colonscopy           The following portions of the patient's history were reviewed and updated as appropriate: allergies, current medications, past family history, past medical history, past social history, past surgical history and problem list     Review of Systems   Constitutional: Negative  HENT: Negative  Eyes: Negative  Respiratory: Negative  Cardiovascular: Negative  Gastrointestinal: Negative  Endocrine: Negative  Genitourinary: Negative  Musculoskeletal: Positive for arthralgias and joint swelling  Skin: Negative  Allergic/Immunologic: Negative  Neurological: Negative  Hematological: Negative  Psychiatric/Behavioral: Negative            Objective:      BP 120/82   Pulse 72   Resp 16   Ht 5' 4 69" (1 643 m)   Wt 63 5 kg (140 lb)   BMI 23 52 kg/m²          Physical Exam   Constitutional: She is oriented to person, place, and time  Vital signs are normal  She appears well-developed and well-nourished  HENT:   Head: Normocephalic and atraumatic  Nose: Nose normal    Mouth/Throat: Oropharynx is clear and moist    Eyes: Conjunctivae, EOM and lids are normal  Pupils are equal, round, and reactive to light  Neck: Normal range of motion  Neck supple  Cardiovascular: Normal rate, regular rhythm, S1 normal, S2 normal, normal heart sounds and intact distal pulses  Pulmonary/Chest: Effort normal and breath sounds normal    Abdominal: Soft  Bowel sounds are normal    Musculoskeletal: Normal range of motion  Neurological: She is alert and oriented to person, place, and time  She has normal strength and normal reflexes  Skin: Skin is warm, dry and intact  Psychiatric: She has a normal mood and affect  Her speech is normal and behavior is normal  Judgment and thought content normal  Cognition and memory are normal    Nursing note and vitals reviewed

## 2018-10-09 ENCOUNTER — OFFICE VISIT (OUTPATIENT)
Dept: FAMILY MEDICINE CLINIC | Facility: CLINIC | Age: 43
End: 2018-10-09
Payer: COMMERCIAL

## 2018-10-09 ENCOUNTER — APPOINTMENT (OUTPATIENT)
Dept: LAB | Facility: CLINIC | Age: 43
End: 2018-10-09
Payer: COMMERCIAL

## 2018-10-09 ENCOUNTER — TRANSCRIBE ORDERS (OUTPATIENT)
Dept: LAB | Facility: CLINIC | Age: 43
End: 2018-10-09

## 2018-10-09 VITALS
WEIGHT: 141.4 LBS | TEMPERATURE: 97.4 F | HEIGHT: 65 IN | SYSTOLIC BLOOD PRESSURE: 110 MMHG | RESPIRATION RATE: 14 BRPM | BODY MASS INDEX: 23.56 KG/M2 | DIASTOLIC BLOOD PRESSURE: 74 MMHG | OXYGEN SATURATION: 96 % | HEART RATE: 65 BPM

## 2018-10-09 DIAGNOSIS — E55.9 VITAMIN D DEFICIENCY: ICD-10-CM

## 2018-10-09 DIAGNOSIS — G43.909 MIGRAINE WITHOUT STATUS MIGRAINOSUS, NOT INTRACTABLE, UNSPECIFIED MIGRAINE TYPE: ICD-10-CM

## 2018-10-09 DIAGNOSIS — I10 BENIGN ESSENTIAL HYPERTENSION: Primary | ICD-10-CM

## 2018-10-09 DIAGNOSIS — I10 BENIGN ESSENTIAL HYPERTENSION: ICD-10-CM

## 2018-10-09 DIAGNOSIS — Z23 NEED FOR INFLUENZA VACCINATION: ICD-10-CM

## 2018-10-09 LAB
25(OH)D3 SERPL-MCNC: 30.2 NG/ML (ref 30–100)
TSH SERPL DL<=0.05 MIU/L-ACNC: 1.15 UIU/ML (ref 0.36–3.74)

## 2018-10-09 PROCEDURE — 3078F DIAST BP <80 MM HG: CPT | Performed by: FAMILY MEDICINE

## 2018-10-09 PROCEDURE — 90471 IMMUNIZATION ADMIN: CPT | Performed by: FAMILY MEDICINE

## 2018-10-09 PROCEDURE — 82306 VITAMIN D 25 HYDROXY: CPT

## 2018-10-09 PROCEDURE — 84443 ASSAY THYROID STIM HORMONE: CPT

## 2018-10-09 PROCEDURE — 1036F TOBACCO NON-USER: CPT | Performed by: FAMILY MEDICINE

## 2018-10-09 PROCEDURE — 3074F SYST BP LT 130 MM HG: CPT | Performed by: FAMILY MEDICINE

## 2018-10-09 PROCEDURE — 36415 COLL VENOUS BLD VENIPUNCTURE: CPT

## 2018-10-09 PROCEDURE — 99214 OFFICE O/P EST MOD 30 MIN: CPT | Performed by: FAMILY MEDICINE

## 2018-10-09 PROCEDURE — 90686 IIV4 VACC NO PRSV 0.5 ML IM: CPT | Performed by: FAMILY MEDICINE

## 2018-10-09 NOTE — PROGRESS NOTES
Assessment/Plan:    Problem List Items Addressed This Visit     Benign essential hypertension - Primary     Stable on nadolol         Relevant Orders    TSH, 3rd generation with Free T4 reflex    Headache, migraine     Stable on nadolol         Relevant Orders    TSH, 3rd generation with Free T4 reflex    Vitamin D deficiency     Last level 20  Will recheck levels         Relevant Orders    Vitamin D 25 hydroxy    TSH, 3rd generation with Free T4 reflex      Other Visit Diagnoses     Need for influenza vaccination        Relevant Orders    SYRINGE/SINGLE-DOSE VIAL: influenza vaccine, [id number], quadrivalent, 0 5 mL, preservative-free, for patients 3+ yr (FLUZONE)          There are no Patient Instructions on file for this visit  No Follow-up on file  Subjective:      Patient ID: Kathy Neri is a 36 y o  female  Chief Complaint   Patient presents with    Follow-up     Patient here for 6 month follow up on Hypertension        Here for follow up  Seen by Gi this morning- getting egd/colonoscopy  Overall feeling well      Hypertension   This is a chronic problem  The current episode started more than 1 year ago  The problem is unchanged  The problem is controlled  Associated symptoms include headaches  There are no associated agents to hypertension  There are no known risk factors for coronary artery disease  Past treatments include beta blockers  The current treatment provides significant improvement  There are no compliance problems  The following portions of the patient's history were reviewed and updated as appropriate: allergies, current medications, past family history, past medical history, past social history, past surgical history and problem list     Review of Systems   Constitutional: Negative  HENT: Negative  Eyes: Negative  Respiratory: Negative  Cardiovascular: Negative  Gastrointestinal: Negative  Endocrine: Negative  Genitourinary: Negative      Musculoskeletal: Negative  Skin: Negative  Allergic/Immunologic: Negative  Neurological: Positive for headaches  Hematological: Negative  Psychiatric/Behavioral: Negative  Current Outpatient Prescriptions   Medication Sig Dispense Refill    Lactobacillus (PROBIOTIC ACIDOPHILUS) CAPS Take 1 tablet by mouth daily      Multiple Vitamins-Minerals (WOMENS MULTI PO) Take by mouth      nadolol (CORGARD) 20 mg tablet Take 1 tablet (20 mg total) by mouth daily 90 tablet 3     No current facility-administered medications for this visit  Objective:    /74   Pulse 65   Temp (!) 97 4 °F (36 3 °C)   Resp 14   Ht 5' 4 69" (1 643 m)   Wt 64 1 kg (141 lb 6 4 oz)   SpO2 96%   BMI 23 76 kg/m²        Physical Exam   Constitutional: She appears well-developed and well-nourished  HENT:   Head: Normocephalic and atraumatic  Eyes: Pupils are equal, round, and reactive to light  Neck: Normal range of motion  Neck supple  Cardiovascular: Normal rate, regular rhythm, normal heart sounds and intact distal pulses  Pulmonary/Chest: Effort normal and breath sounds normal    Abdominal: Soft  Musculoskeletal: Normal range of motion  Neurological: She is alert  Skin: Skin is warm  Nursing note and vitals reviewed               Brianna Guadalupe DO

## 2019-01-07 ENCOUNTER — TELEPHONE (OUTPATIENT)
Dept: FAMILY MEDICINE CLINIC | Facility: CLINIC | Age: 44
End: 2019-01-07

## 2019-02-08 ENCOUNTER — OFFICE VISIT (OUTPATIENT)
Dept: FAMILY MEDICINE CLINIC | Facility: CLINIC | Age: 44
End: 2019-02-08
Payer: COMMERCIAL

## 2019-02-08 VITALS
BODY MASS INDEX: 23.83 KG/M2 | WEIGHT: 139.6 LBS | SYSTOLIC BLOOD PRESSURE: 120 MMHG | RESPIRATION RATE: 12 BRPM | OXYGEN SATURATION: 97 % | TEMPERATURE: 96.2 F | HEIGHT: 64 IN | HEART RATE: 56 BPM | DIASTOLIC BLOOD PRESSURE: 80 MMHG

## 2019-02-08 DIAGNOSIS — J01.00 ACUTE NON-RECURRENT MAXILLARY SINUSITIS: Primary | ICD-10-CM

## 2019-02-08 PROCEDURE — 3008F BODY MASS INDEX DOCD: CPT | Performed by: NURSE PRACTITIONER

## 2019-02-08 PROCEDURE — 1036F TOBACCO NON-USER: CPT | Performed by: NURSE PRACTITIONER

## 2019-02-08 PROCEDURE — 99213 OFFICE O/P EST LOW 20 MIN: CPT | Performed by: NURSE PRACTITIONER

## 2019-02-08 RX ORDER — AMOXICILLIN 875 MG/1
875 TABLET, COATED ORAL 2 TIMES DAILY
Refills: 0 | COMMUNITY
Start: 2019-02-03 | End: 2019-02-08

## 2019-02-08 RX ORDER — CEFDINIR 300 MG/1
300 CAPSULE ORAL EVERY 12 HOURS SCHEDULED
Qty: 20 CAPSULE | Refills: 0 | Status: SHIPPED | OUTPATIENT
Start: 2019-02-08 | End: 2019-02-18

## 2019-02-08 NOTE — PROGRESS NOTES
Assessment/Plan:           Problem List Items Addressed This Visit     None      Visit Diagnoses     Acute non-recurrent maxillary sinusitis    -  Primary    Relevant Medications    cefdinir (OMNICEF) 300 mg capsule            Subjective:      Patient ID: Juan Gaming is a 39 y o  female  Started to get ill with ha  Does have migraines  Maxillary pan and pressure  Used teledoc  Ear pressure  Used amoxicillin  Not feeling better  Last night bad headache  pnd  Ear pressure        Sinus Problem   Associated symptoms include congestion, headaches and sinus pressure  Pertinent negatives include no coughing, ear pain, neck pain, shortness of breath or sore throat  The following portions of the patient's history were reviewed and updated as appropriate: allergies, current medications, past family history, past medical history, past social history, past surgical history and problem list     Review of Systems   Constitutional: Positive for fatigue  Negative for activity change, appetite change and fever  HENT: Positive for congestion, postnasal drip, sinus pain and sinus pressure  Negative for ear pain and sore throat  Eyes: Negative for photophobia and visual disturbance  Respiratory: Negative for cough, shortness of breath and wheezing  Cardiovascular: Negative for chest pain and palpitations  Gastrointestinal: Negative for constipation, diarrhea, nausea and vomiting  Genitourinary: Negative for dysuria, frequency and hematuria  Musculoskeletal: Negative for arthralgias, back pain, myalgias and neck pain  Skin: Negative for rash  Neurological: Positive for headaches  Negative for dizziness, weakness and light-headedness  Hematological: Negative for adenopathy  Psychiatric/Behavioral: Negative for decreased concentration and sleep disturbance  The patient is not nervous/anxious            Objective:      /80 (BP Location: Right arm, Patient Position: Sitting, Cuff Size: Standard) Pulse 56   Resp 12   Ht 5' 4" (1 626 m)   Wt 63 3 kg (139 lb 9 6 oz)   SpO2 97%   BMI 23 96 kg/m²     /80 (BP Location: Right arm, Patient Position: Sitting, Cuff Size: Standard)   Pulse 56   Temp (!) 96 2 °F (35 7 °C) (Tympanic)   Resp 12   Ht 5' 4" (1 626 m)   Wt 63 3 kg (139 lb 9 6 oz)   SpO2 97%   BMI 23 96 kg/m²     Family History   Problem Relation Age of Onset    Hypertension Mother         essential    Diabetes type II Mother     Melanoma Father     Ulcerative colitis Sister     HLA-B27 positive Sister     Asthma Family     Depression Family      Past Medical History:   Diagnosis Date    Fibromyalgia     Hypertension     Migraine      Social History     Social History    Marital status: /Civil Union     Spouse name: N/A    Number of children: N/A    Years of education: N/A     Occupational History    Not on file  Social History Main Topics    Smoking status: Never Smoker    Smokeless tobacco: Never Used      Comment: Non-smoker    Alcohol use Yes      Comment: very rare    Drug use: No    Sexual activity: Yes     Other Topics Concern    Not on file     Social History Narrative    Drinks coffee           Current Outpatient Prescriptions:     cefdinir (OMNICEF) 300 mg capsule, Take 1 capsule (300 mg total) by mouth every 12 (twelve) hours for 10 days, Disp: 20 capsule, Rfl: 0    Lactobacillus (PROBIOTIC ACIDOPHILUS) CAPS, Take 1 tablet by mouth daily, Disp: , Rfl:     Multiple Vitamins-Minerals (WOMENS MULTI PO), Take by mouth, Disp: , Rfl:     nadolol (CORGARD) 20 mg tablet, Take 1 tablet (20 mg total) by mouth daily, Disp: 90 tablet, Rfl: 3  Allergies   Allergen Reactions    Cholecalciferol     Erythromycin Vomiting and Other (See Comments)     nausea  Reaction Date: 19Apr2011;     Pollen Extract       Physical Exam   Constitutional: She is oriented to person, place, and time  She appears well-developed and well-nourished     HENT:   Head: Normocephalic and atraumatic  Right Ear: External ear normal    Left Ear: External ear normal    Nose: Mucosal edema and rhinorrhea present  Mouth/Throat: Posterior oropharyngeal erythema present  Eyes: Conjunctivae are normal    Neck: Normal range of motion  Neck supple  No thyromegaly present  Cardiovascular: Normal rate, regular rhythm and normal heart sounds  Pulmonary/Chest: Effort normal and breath sounds normal    Abdominal: Soft  Bowel sounds are normal    Musculoskeletal: Normal range of motion  Neurological: She is alert and oriented to person, place, and time  Skin: Skin is warm and dry  Psychiatric: She has a normal mood and affect  Her behavior is normal  Judgment and thought content normal    Nursing note and vitals reviewed

## 2019-03-23 DIAGNOSIS — G43.909 MIGRAINE WITHOUT STATUS MIGRAINOSUS, NOT INTRACTABLE, UNSPECIFIED MIGRAINE TYPE: ICD-10-CM

## 2019-03-23 DIAGNOSIS — I10 ESSENTIAL HYPERTENSION: ICD-10-CM

## 2019-03-23 RX ORDER — NADOLOL 20 MG/1
20 TABLET ORAL DAILY
Qty: 90 TABLET | Refills: 0 | Status: CANCELLED | OUTPATIENT
Start: 2019-03-23

## 2019-03-25 DIAGNOSIS — I10 ESSENTIAL HYPERTENSION: ICD-10-CM

## 2019-03-25 DIAGNOSIS — G43.909 MIGRAINE WITHOUT STATUS MIGRAINOSUS, NOT INTRACTABLE, UNSPECIFIED MIGRAINE TYPE: ICD-10-CM

## 2019-03-25 RX ORDER — NADOLOL 20 MG/1
20 TABLET ORAL DAILY
Qty: 90 TABLET | Refills: 3 | Status: SHIPPED | OUTPATIENT
Start: 2019-03-25 | End: 2020-03-10 | Stop reason: SDUPTHER

## 2019-03-29 DIAGNOSIS — Z00.00 WELL ADULT EXAM: Primary | ICD-10-CM

## 2019-03-30 DIAGNOSIS — G43.719 INTRACTABLE CHRONIC MIGRAINE WITHOUT AURA AND WITHOUT STATUS MIGRAINOSUS: ICD-10-CM

## 2019-03-30 DIAGNOSIS — E55.9 VITAMIN D DEFICIENCY: ICD-10-CM

## 2019-03-30 DIAGNOSIS — I10 BENIGN ESSENTIAL HYPERTENSION: Primary | ICD-10-CM

## 2019-04-14 LAB
25(OH)D3 SERPL-MCNC: 37 NG/ML (ref 30–100)
BASOPHILS # BLD AUTO: 59 CELLS/UL (ref 0–200)
BASOPHILS NFR BLD AUTO: 0.9 %
CHOLEST SERPL-MCNC: 177 MG/DL
CHOLEST/HDLC SERPL: 2.6 (CALC)
EOSINOPHIL # BLD AUTO: 53 CELLS/UL (ref 15–500)
EOSINOPHIL NFR BLD AUTO: 0.8 %
ERYTHROCYTE [DISTWIDTH] IN BLOOD BY AUTOMATED COUNT: 12.2 % (ref 11–15)
HCT VFR BLD AUTO: 44.1 % (ref 35–45)
HDLC SERPL-MCNC: 67 MG/DL
HGB BLD-MCNC: 15 G/DL (ref 11.7–15.5)
LDLC SERPL CALC-MCNC: 92 MG/DL (CALC)
LYMPHOCYTES # BLD AUTO: 1881 CELLS/UL (ref 850–3900)
LYMPHOCYTES NFR BLD AUTO: 28.5 %
MCH RBC QN AUTO: 30.8 PG (ref 27–33)
MCHC RBC AUTO-ENTMCNC: 34 G/DL (ref 32–36)
MCV RBC AUTO: 90.6 FL (ref 80–100)
MONOCYTES # BLD AUTO: 574 CELLS/UL (ref 200–950)
MONOCYTES NFR BLD AUTO: 8.7 %
NEUTROPHILS # BLD AUTO: 4033 CELLS/UL (ref 1500–7800)
NEUTROPHILS NFR BLD AUTO: 61.1 %
NONHDLC SERPL-MCNC: 110 MG/DL (CALC)
PLATELET # BLD AUTO: 328 THOUSAND/UL (ref 140–400)
PMV BLD REES-ECKER: 10.2 FL (ref 7.5–12.5)
RBC # BLD AUTO: 4.87 MILLION/UL (ref 3.8–5.1)
TRIGL SERPL-MCNC: 89 MG/DL
TSH SERPL-ACNC: 1.79 MIU/L
WBC # BLD AUTO: 6.6 THOUSAND/UL (ref 3.8–10.8)

## 2019-04-24 ENCOUNTER — OFFICE VISIT (OUTPATIENT)
Dept: FAMILY MEDICINE CLINIC | Facility: CLINIC | Age: 44
End: 2019-04-24
Payer: COMMERCIAL

## 2019-04-24 VITALS
TEMPERATURE: 97.2 F | DIASTOLIC BLOOD PRESSURE: 82 MMHG | SYSTOLIC BLOOD PRESSURE: 120 MMHG | WEIGHT: 140.4 LBS | HEIGHT: 65 IN | OXYGEN SATURATION: 100 % | BODY MASS INDEX: 23.39 KG/M2 | RESPIRATION RATE: 13 BRPM | HEART RATE: 62 BPM

## 2019-04-24 DIAGNOSIS — I10 BENIGN ESSENTIAL HYPERTENSION: ICD-10-CM

## 2019-04-24 DIAGNOSIS — Z00.00 ANNUAL PHYSICAL EXAM: Primary | ICD-10-CM

## 2019-04-24 PROCEDURE — 99396 PREV VISIT EST AGE 40-64: CPT | Performed by: FAMILY MEDICINE

## 2019-04-24 RX ORDER — CALCIUM CARBONATE 300MG(750)
TABLET,CHEWABLE ORAL
COMMUNITY

## 2019-10-24 ENCOUNTER — OFFICE VISIT (OUTPATIENT)
Dept: FAMILY MEDICINE CLINIC | Facility: CLINIC | Age: 44
End: 2019-10-24
Payer: COMMERCIAL

## 2019-10-24 VITALS
HEIGHT: 65 IN | SYSTOLIC BLOOD PRESSURE: 120 MMHG | WEIGHT: 146.8 LBS | OXYGEN SATURATION: 99 % | DIASTOLIC BLOOD PRESSURE: 72 MMHG | HEART RATE: 66 BPM | RESPIRATION RATE: 14 BRPM | BODY MASS INDEX: 24.46 KG/M2

## 2019-10-24 DIAGNOSIS — I10 BENIGN ESSENTIAL HYPERTENSION: Primary | ICD-10-CM

## 2019-10-24 DIAGNOSIS — M25.50 MULTIPLE JOINT PAIN: ICD-10-CM

## 2019-10-24 DIAGNOSIS — G43.909 MIGRAINE WITHOUT STATUS MIGRAINOSUS, NOT INTRACTABLE, UNSPECIFIED MIGRAINE TYPE: ICD-10-CM

## 2019-10-24 DIAGNOSIS — E55.9 VITAMIN D DEFICIENCY: ICD-10-CM

## 2019-10-24 DIAGNOSIS — M54.41 ACUTE RIGHT-SIDED LOW BACK PAIN WITH RIGHT-SIDED SCIATICA: ICD-10-CM

## 2019-10-24 PROCEDURE — 99214 OFFICE O/P EST MOD 30 MIN: CPT | Performed by: FAMILY MEDICINE

## 2019-10-24 PROCEDURE — 3074F SYST BP LT 130 MM HG: CPT | Performed by: FAMILY MEDICINE

## 2019-10-24 PROCEDURE — 3078F DIAST BP <80 MM HG: CPT | Performed by: FAMILY MEDICINE

## 2019-10-24 PROCEDURE — 3008F BODY MASS INDEX DOCD: CPT | Performed by: FAMILY MEDICINE

## 2019-10-24 NOTE — PROGRESS NOTES
Assessment/Plan:    1  Benign essential hypertension  Assessment & Plan:  Stable on nadolol    Orders:  -     CBC; Future; Expected date: 04/01/2020  -     Comprehensive metabolic panel; Future; Expected date: 04/01/2020  -     Lipid Panel with Direct LDL reflex; Future; Expected date: 04/01/2020    2  Migraine without status migrainosus, not intractable, unspecified migraine type  Assessment & Plan:  Stable on nadolol except when eating poorly    Orders:  -     TSH, 3rd generation with Free T4 reflex; Future; Expected date: 04/01/2020    3  Acute right-sided low back pain with right-sided sciatica  -     Ambulatory referral to Physical Therapy; Future    4  Multiple joint pain  Assessment & Plan:  Had appt with Dr Odilon Kiser      5  Vitamin D deficiency  -     Vitamin D 25 hydroxy; Future; Expected date: 04/01/2020        BMI Counseling: Body mass index is 24 52 kg/m²  Discussed the patient's BMI with her  The BMI is above normal  Nutrition recommendations include 3-5 servings of fruits/vegetables daily  Normal BMI          There are no Patient Instructions on file for this visit  No follow-ups on file  Subjective:      Patient ID: Elvis Bautista is a 39 y o  female  Chief Complaint   Patient presents with    Follow-up     6 month follow up       Here for follow  Having issues with lower back, right sided pain down leg  Has tried ibuprofen  bp stable      Back Pain   This is a new problem  The current episode started more than 1 month ago  The problem occurs intermittently  The problem is unchanged  The pain is present in the lumbar spine  The pain radiates to the right thigh  The pain is at a severity of 4/10  The pain is mild  The pain is worse during the night  Associated symptoms include leg pain  Pertinent negatives include no pelvic pain or perianal numbness  She has tried nothing for the symptoms  The treatment provided no relief         The following portions of the patient's history were reviewed and updated as appropriate: allergies, current medications, past family history, past medical history, past social history, past surgical history and problem list     Review of Systems   Constitutional: Negative  HENT: Negative  Eyes: Negative  Respiratory: Negative  Cardiovascular: Negative  Gastrointestinal: Negative  Endocrine: Negative  Genitourinary: Negative  Negative for pelvic pain  Musculoskeletal: Positive for back pain  Skin: Negative  Allergic/Immunologic: Negative  Neurological: Negative  Hematological: Negative  Psychiatric/Behavioral: Negative  Current Outpatient Medications   Medication Sig Dispense Refill    Cholecalciferol (VITAMIN D3) 1000 units CHEW Taking it Monday, Wednesday, Friday      Lactobacillus (PROBIOTIC ACIDOPHILUS) CAPS Take 1 tablet by mouth daily      Multiple Vitamins-Minerals (WOMENS MULTI PO) Take by mouth      nadolol (CORGARD) 20 mg tablet Take 1 tablet (20 mg total) by mouth daily 90 tablet 3     No current facility-administered medications for this visit  Objective:    /72 (BP Location: Left arm, Patient Position: Sitting, Cuff Size: Standard)   Pulse 66   Resp 14   Ht 5' 4 88" (1 648 m)   Wt 66 6 kg (146 lb 12 8 oz)   SpO2 99%   BMI 24 52 kg/m²        Physical Exam   Constitutional: She appears well-developed and well-nourished  HENT:   Head: Normocephalic and atraumatic  Eyes: Pupils are equal, round, and reactive to light  Neck: Normal range of motion  Neck supple  Cardiovascular: Normal rate, regular rhythm, normal heart sounds and intact distal pulses  Pulmonary/Chest: Effort normal and breath sounds normal    Abdominal: Soft  Bowel sounds are normal    Musculoskeletal: She exhibits tenderness (lower back,lumbar tenderness, mid buttock pain)  Neurological: She is alert  Skin: Skin is warm  Capillary refill takes less than 2 seconds  Psychiatric: She has a normal mood and affect   Her behavior is normal  Judgment and thought content normal    Nursing note and vitals reviewed               Ileana Heading, DO

## 2019-11-08 ENCOUNTER — OFFICE VISIT (OUTPATIENT)
Dept: RHEUMATOLOGY | Facility: CLINIC | Age: 44
End: 2019-11-08
Payer: COMMERCIAL

## 2019-11-08 VITALS
BODY MASS INDEX: 24.66 KG/M2 | DIASTOLIC BLOOD PRESSURE: 72 MMHG | HEART RATE: 70 BPM | HEIGHT: 65 IN | SYSTOLIC BLOOD PRESSURE: 124 MMHG | WEIGHT: 148 LBS

## 2019-11-08 DIAGNOSIS — M54.50 CHRONIC BILATERAL LOW BACK PAIN WITHOUT SCIATICA: ICD-10-CM

## 2019-11-08 DIAGNOSIS — M47.812 OSTEOARTHRITIS OF CERVICAL SPINE, UNSPECIFIED SPINAL OSTEOARTHRITIS COMPLICATION STATUS: Primary | ICD-10-CM

## 2019-11-08 DIAGNOSIS — M25.50 MULTIPLE JOINT PAIN: ICD-10-CM

## 2019-11-08 DIAGNOSIS — G89.29 CHRONIC BILATERAL LOW BACK PAIN WITHOUT SCIATICA: ICD-10-CM

## 2019-11-08 DIAGNOSIS — M79.10 MYALGIA: ICD-10-CM

## 2019-11-08 PROCEDURE — 99244 OFF/OP CNSLTJ NEW/EST MOD 40: CPT | Performed by: INTERNAL MEDICINE

## 2019-11-08 NOTE — PROGRESS NOTES
Assessment and Plan:   Patient is a 54-year-old female with chronic neck and back pain who presents for rheumatologic consult regarding another opinion her chronic back and neck pain and diagnosis of fibromyalgia  Discussion with her reveals greater than 10 years of neck and back pain and this is her 4th rheumatologic evaluation  She reports a sister with ankylosing spondylitis  Patient had rheumatologic evaluation 2018 for this condition and she was HLA B27 negative and also had a sacroiliac joint MRI, which was also negative for sacroiliitis or signs of ankylosing spondylitis  She previously has a negative ELVIRA and rheumatoid factor on more than 1 occasion and has had normal markers of inflammation  Her exam does not show any signs of inflammatory arthritis  She does have reproducible myofascial and spine tenderness throughout her neck and back, but really does not have the other fibromyalgia tender points in the periphery  She seems to have predominant issues with her neck and back pain and I discussed with her that since inflammatory spondylitis has been adequately ruled out, I have nothing additional to offer from a rheumatologic standpoint  I did offer to refer her to aquatic therapy but she declined for now  I did refer her to spine and Pain Management for additional evaluation of her chronic neck and back pain as I suspect she has degenerative disease and there are no signs of inflammatory disease  She was agreeable with that plan  I do not think she needs additional rheumatologic workup or follow-up  Plan:  Diagnoses and all orders for this visit:    Osteoarthritis of cervical spine, unspecified spinal osteoarthritis complication status  -     Ambulatory referral to Pain Management;  Future    Multiple joint pain    Myalgia    Chronic bilateral low back pain without sciatica        Follow-up plan: no rheumatology follow-up needed      ANGELA  Joon Crandall is a 38 y o   female with migraines, hypertension, vitamin-D deficiency who presents for rheumatology consult by request of Dr Meliton Johnson for polyarthralgia  Record review show she had recent rheumatologic evaluation in August 2018 by Dr Kristi Boswell  She also had 2 other rheumatologic evaluations a few years prior  Dr Kristi Boswell notes that Patient had autoimmune workup in April 2018 which was normal and showed negative ELVIRA, rheumatoid factor, HLA B27, negative Lyme, normal ESR and CRP  In 2017 she also had negative rheumatoid factor, ELVIRA, HLA B27, ds DNA, Lyme, normal ESR and CRP  Dr Kristi Boswell had patient do x-ray of the sacroiliac joints which showed degenerative changes  She then subsequently had MRI of the sacroiliac joints in May 2018 which showed she again degenerative changes but no signs of inflammatory sacroiliitis or signs of ankylosing spondylitis  She felt the patient had fibromyalgia and did not need additional rheumatologic follow-up  Patient reports this is her 4th rheumatologist  She wants clarification on her diagnosis of fibromyalgia and the reason for her chronic neck and back pain  Chronic pain started >10 years ago, in her hips and neck and back   She occasionally has peripheral joint pain to a lesser degree in her hands and knees, but her main issue and pain on a daily basis is her lower back, bilateral hip and neck pain  Morning stiffness in the low back and hips  This takes a long time to go away but does not always go away  She has chronic IBS  She does take Aleve as needed over-the-counter  She previously tried amitriptyline but did not help her  No other meds for fibromyalgia  She has done physical therapy but never did aquatic therapy  She does try to exercise and do physical therapy on her own at home  Denies photosensitivity, rashes, psoriasis, sicca symptoms, oral or nasal ulcers, alopecia, Raynaud's, h/o pericarditis or pleurisy, h/o blood clots or miscarriages       Review of Systems  Review of Systems   Constitutional: Negative for chills, fatigue, fever and unexpected weight change  HENT: Negative for mouth sores and trouble swallowing  Eyes: Negative for pain and visual disturbance  Respiratory: Negative for cough and shortness of breath  Cardiovascular: Negative for chest pain and leg swelling  Gastrointestinal: Negative for abdominal pain, blood in stool, constipation, diarrhea and nausea  Genitourinary: Negative for hematuria  Musculoskeletal: Positive for arthralgias, back pain, myalgias and neck pain  Negative for joint swelling  Skin: Negative for color change and rash  Neurological: Negative for weakness and numbness  Hematological: Negative for adenopathy  Psychiatric/Behavioral: Negative for sleep disturbance  Allergies  Allergies   Allergen Reactions    Cholecalciferol     Erythromycin Vomiting and Other (See Comments)     nausea  Reaction Date: 19Apr2011;     Pollen Extract        Home Medications    Current Outpatient Medications:     Cholecalciferol (VITAMIN D3) 1000 units CHEW, Taking it Monday, Wednesday, Friday, Disp: , Rfl:     Multiple Vitamins-Minerals (WOMENS MULTI PO), Take by mouth, Disp: , Rfl:     nadolol (CORGARD) 20 mg tablet, Take 1 tablet (20 mg total) by mouth daily, Disp: 90 tablet, Rfl: 3    Past Medical History  Past Medical History:   Diagnosis Date    Fibromyalgia     Hypertension     Migraine        Past Surgical History   Past Surgical History:   Procedure Laterality Date    COLONOSCOPY      Fiberoptic    DILATION AND CURETTAGE OF UTERUS      HYSTERECTOMY  2010    HYSTEROSCOPY W/ ENDOMETRIAL ABLATION      OVARIAN CYST DRAINAGE      TONSILLECTOMY         Family History  No known family history of autoimmune or inflammatory diseases      Family History   Problem Relation Age of Onset    Hypertension Mother         essential    Diabetes type II Mother     Melanoma Father     Ulcerative colitis Sister     HLA-B27 positive Sister     Asthma Family     Depression Family        Social History  Occupation: judicial    Social History     Substance and Sexual Activity   Alcohol Use Yes    Frequency: Monthly or less    Drinks per session: 1 or 2     Social History     Substance and Sexual Activity   Drug Use No     Social History     Tobacco Use   Smoking Status Never Smoker   Smokeless Tobacco Never Used       Objective:    Vitals:    11/08/19 0758   BP: 124/72   Pulse: 70   Weight: 67 1 kg (148 lb)   Height: 5' 4 88" (1 648 m)       Physical Exam   Constitutional: She appears well-developed and well-nourished  She is cooperative  No distress  HENT:   Head: Normocephalic and atraumatic  Mouth/Throat: Oropharynx is clear and moist and mucous membranes are normal    Eyes: Conjunctivae and EOM are normal  No scleral icterus  Neck: Neck supple  No spinous process tenderness and no muscular tenderness present  No thyromegaly present  Cardiovascular: Normal rate, regular rhythm, S1 normal and S2 normal  Exam reveals no friction rub  No murmur heard  Pulmonary/Chest: Breath sounds normal  No respiratory distress  She has no wheezes  She has no rhonchi  She has no rales  Musculoskeletal:   Soft tissue tenderness across the trapezius and upper back, throughout the paraspinal musculature  Reducible soft tissue tenderness in the arms or legs  Peripheral joints do not have reproducible tenderness aside from the lateral hips  No joint swelling or synovitis anywhere  Lymphadenopathy:     She has no cervical adenopathy  Neurological: She is alert  No sensory deficit  Skin: Skin is warm and dry  No rash noted  Nails show no clubbing  Psychiatric: She has a normal mood and affect         Imaging:   MRI SI joints 5/2018:      Labs:   Component      Latest Ref Rng & Units 10/9/2017   LYME AB IGG      0 00 - 0 79 0 23   LYME AB IGM      0 00 - 0 79 0 23   ANTI-NUCLEAR ANTIBODY (ELVIRA)      Negative Negative   C-REACTIVE PROTEIN      <3 0 mg/L <3 0   ANTI DNA DOUBLE STRANDED      0 - 9 IU/mL <1   Sed Rate      0 - 20 mm/hour 8   RHEUMATOID FACTOR      Negative Negative   HLA B27       Negative

## 2019-11-25 ENCOUNTER — ANNUAL EXAM (OUTPATIENT)
Dept: OBGYN CLINIC | Facility: CLINIC | Age: 44
End: 2019-11-25
Payer: COMMERCIAL

## 2019-11-25 VITALS
SYSTOLIC BLOOD PRESSURE: 110 MMHG | BODY MASS INDEX: 24.41 KG/M2 | WEIGHT: 143 LBS | HEIGHT: 64 IN | DIASTOLIC BLOOD PRESSURE: 82 MMHG

## 2019-11-25 DIAGNOSIS — Z01.419 ENCOUNTER FOR ANNUAL ROUTINE GYNECOLOGICAL EXAMINATION: ICD-10-CM

## 2019-11-25 DIAGNOSIS — Z12.31 SCREENING MAMMOGRAM FOR HIGH-RISK PATIENT: Primary | ICD-10-CM

## 2019-11-25 PROCEDURE — S0612 ANNUAL GYNECOLOGICAL EXAMINA: HCPCS | Performed by: OBSTETRICS & GYNECOLOGY

## 2019-11-25 NOTE — PROGRESS NOTES
Assessment:    Normal breast and GYN exam    Plan:    Encouraged healthy diet and exercise  Rx mammo    Subjective:      Patient ID: Joshua Mar is a 38 y  o  female  S/P LAV 2012    Colonoscopy 2018-no polyps    Pt denies pelvic pain, breast pain, breast lumps, changes in bowel or bladder habits, or vaginal bleeding  Review of Systems   Constitutional: Negative  HENT: Negative  Eyes: Negative  Respiratory: Negative  Cardiovascular: Negative  Gastrointestinal: Negative  Endocrine: Negative  Musculoskeletal: Negative  Skin: Negative  Allergic/Immunologic: Negative  Neurological: Negative  Hematological: Negative  Psychiatric/Behavioral: Negative  All other systems reviewed and are negative  Objective:      /82 (BP Location: Left arm, Patient Position: Sitting, Cuff Size: Standard)   Ht 5' 4" (1 626 m)   Wt 64 9 kg (143 lb)   BMI 24 55 kg/m²          Physical Exam   Constitutional: She appears well-developed and well-nourished  Cardiovascular: Normal rate, regular rhythm and normal heart sounds  Pulmonary/Chest: Effort normal  No stridor  No respiratory distress  She has no wheezes  She has no rales  She exhibits no tenderness  Right breast exhibits no inverted nipple, no mass, no nipple discharge, no skin change and no tenderness  Left breast exhibits no inverted nipple, no mass, no nipple discharge, no skin change and no tenderness  No breast swelling, tenderness, discharge or bleeding  Breasts are symmetrical    Abdominal: Soft  Bowel sounds are normal  She exhibits no distension and no mass  There is no tenderness  There is no rebound and no guarding  No hernia  Hernia confirmed negative in the right inguinal area and confirmed negative in the left inguinal area     Genitourinary: Vagina normal  Rectal exam shows no external hemorrhoid, no internal hemorrhoid, no fissure, no mass, no tenderness and anal tone normal  No breast swelling, tenderness, discharge or bleeding  No labial fusion  There is no rash, tenderness, lesion or injury on the right labia  There is no rash, tenderness, lesion or injury on the left labia  Right adnexum displays no mass, no tenderness and no fullness  Left adnexum displays no mass, no tenderness and no fullness  No erythema, tenderness or bleeding in the vagina  No foreign body in the vagina  No signs of injury around the vagina  No vaginal discharge found  Genitourinary Comments: Urethral meatus normal    Absent uterus, cervix  No rectocele, cystocele, vaginal prolapse  Does kegels well, good muscle tone   Lymphadenopathy: No inguinal adenopathy noted on the right or left side  Psychiatric: She has a normal mood and affect   Her behavior is normal  Judgment and thought content normal

## 2019-11-25 NOTE — PROGRESS NOTES
The patient is here for a yearly  The patient had a LAVH so she does not need a pap smear  No bleeding or cramping  No vaginal or urinary issues  No breast concerns

## 2020-02-04 DIAGNOSIS — Z12.31 SCREENING MAMMOGRAM FOR HIGH-RISK PATIENT: ICD-10-CM

## 2020-03-10 DIAGNOSIS — I10 ESSENTIAL HYPERTENSION: ICD-10-CM

## 2020-03-10 DIAGNOSIS — G43.909 MIGRAINE WITHOUT STATUS MIGRAINOSUS, NOT INTRACTABLE, UNSPECIFIED MIGRAINE TYPE: ICD-10-CM

## 2020-03-10 RX ORDER — NADOLOL 20 MG/1
20 TABLET ORAL DAILY
Qty: 90 TABLET | Refills: 0 | Status: SHIPPED | OUTPATIENT
Start: 2020-03-10 | End: 2020-06-08 | Stop reason: SDUPTHER

## 2020-04-24 RX ORDER — CELECOXIB 200 MG/1
200 CAPSULE ORAL DAILY
COMMUNITY
Start: 2020-02-25 | End: 2021-03-05

## 2020-04-27 ENCOUNTER — OFFICE VISIT (OUTPATIENT)
Dept: FAMILY MEDICINE CLINIC | Facility: CLINIC | Age: 45
End: 2020-04-27
Payer: COMMERCIAL

## 2020-04-27 VITALS
TEMPERATURE: 97.1 F | RESPIRATION RATE: 13 BRPM | WEIGHT: 154 LBS | SYSTOLIC BLOOD PRESSURE: 114 MMHG | OXYGEN SATURATION: 99 % | HEART RATE: 69 BPM | HEIGHT: 64 IN | DIASTOLIC BLOOD PRESSURE: 80 MMHG | BODY MASS INDEX: 26.29 KG/M2

## 2020-04-27 DIAGNOSIS — M45.9 ANKYLOSING SPONDYLITIS, UNSPECIFIED SITE OF SPINE (HCC): ICD-10-CM

## 2020-04-27 DIAGNOSIS — Z00.00 ANNUAL PHYSICAL EXAM: Primary | ICD-10-CM

## 2020-04-27 DIAGNOSIS — M81.0 OSTEOPOROSIS, UNSPECIFIED OSTEOPOROSIS TYPE, UNSPECIFIED PATHOLOGICAL FRACTURE PRESENCE: ICD-10-CM

## 2020-04-27 PROBLEM — M79.7 FIBROMYALGIA: Status: ACTIVE | Noted: 2020-04-27

## 2020-04-27 PROCEDURE — 3079F DIAST BP 80-89 MM HG: CPT | Performed by: FAMILY MEDICINE

## 2020-04-27 PROCEDURE — 99396 PREV VISIT EST AGE 40-64: CPT | Performed by: FAMILY MEDICINE

## 2020-04-27 PROCEDURE — 3008F BODY MASS INDEX DOCD: CPT | Performed by: FAMILY MEDICINE

## 2020-04-27 PROCEDURE — 3074F SYST BP LT 130 MM HG: CPT | Performed by: FAMILY MEDICINE

## 2020-05-03 ENCOUNTER — TELEPHONE (OUTPATIENT)
Dept: OBGYN CLINIC | Facility: CLINIC | Age: 45
End: 2020-05-03

## 2020-05-06 ENCOUNTER — TRANSCRIBE ORDERS (OUTPATIENT)
Dept: OBGYN CLINIC | Facility: CLINIC | Age: 45
End: 2020-05-06

## 2020-05-06 DIAGNOSIS — N95.1 MENOPAUSAL SYMPTOMS: Primary | ICD-10-CM

## 2020-05-11 ENCOUNTER — TELEPHONE (OUTPATIENT)
Dept: OBGYN CLINIC | Facility: CLINIC | Age: 45
End: 2020-05-11

## 2020-05-12 ENCOUNTER — TELEPHONE (OUTPATIENT)
Dept: OBGYN CLINIC | Facility: CLINIC | Age: 45
End: 2020-05-12

## 2020-05-13 DIAGNOSIS — Z78.0 MENOPAUSE: Primary | ICD-10-CM

## 2020-05-13 RX ORDER — ESTRADIOL 0.5 MG/1
0.5 TABLET ORAL DAILY
Qty: 90 TABLET | Refills: 1 | Status: SHIPPED | OUTPATIENT
Start: 2020-05-13 | End: 2020-11-30 | Stop reason: ALTCHOICE

## 2020-06-08 DIAGNOSIS — G43.909 MIGRAINE WITHOUT STATUS MIGRAINOSUS, NOT INTRACTABLE, UNSPECIFIED MIGRAINE TYPE: ICD-10-CM

## 2020-06-08 DIAGNOSIS — I10 ESSENTIAL HYPERTENSION: ICD-10-CM

## 2020-06-08 RX ORDER — NADOLOL 20 MG/1
20 TABLET ORAL DAILY
Qty: 90 TABLET | Refills: 3 | Status: SHIPPED | OUTPATIENT
Start: 2020-06-08 | End: 2021-08-20

## 2020-06-08 RX ORDER — NADOLOL 20 MG/1
20 TABLET ORAL DAILY
Qty: 90 TABLET | Refills: 0 | Status: SHIPPED | OUTPATIENT
Start: 2020-06-08 | End: 2020-06-08 | Stop reason: SDUPTHER

## 2020-11-30 ENCOUNTER — ANNUAL EXAM (OUTPATIENT)
Dept: OBGYN CLINIC | Facility: CLINIC | Age: 45
End: 2020-11-30
Payer: COMMERCIAL

## 2020-11-30 VITALS
WEIGHT: 137 LBS | SYSTOLIC BLOOD PRESSURE: 130 MMHG | BODY MASS INDEX: 23.39 KG/M2 | DIASTOLIC BLOOD PRESSURE: 70 MMHG | HEIGHT: 64 IN

## 2020-11-30 DIAGNOSIS — Z01.419 ENCOUNTER FOR ANNUAL ROUTINE GYNECOLOGICAL EXAMINATION: ICD-10-CM

## 2020-11-30 DIAGNOSIS — Z12.31 ENCOUNTER FOR SCREENING MAMMOGRAM FOR BREAST CANCER: Primary | ICD-10-CM

## 2020-11-30 PROCEDURE — S0612 ANNUAL GYNECOLOGICAL EXAMINA: HCPCS | Performed by: OBSTETRICS & GYNECOLOGY

## 2021-02-04 DIAGNOSIS — Z12.31 ENCOUNTER FOR SCREENING MAMMOGRAM FOR BREAST CANCER: ICD-10-CM

## 2021-03-05 DIAGNOSIS — J01.00 ACUTE NON-RECURRENT MAXILLARY SINUSITIS: Primary | ICD-10-CM

## 2021-03-05 RX ORDER — AMOXICILLIN AND CLAVULANATE POTASSIUM 875; 125 MG/1; MG/1
1 TABLET, FILM COATED ORAL EVERY 12 HOURS SCHEDULED
Qty: 20 TABLET | Refills: 0 | Status: SHIPPED | OUTPATIENT
Start: 2021-03-05 | End: 2021-03-15

## 2021-03-10 DIAGNOSIS — Z23 ENCOUNTER FOR IMMUNIZATION: ICD-10-CM

## 2021-03-14 ENCOUNTER — IMMUNIZATIONS (OUTPATIENT)
Dept: FAMILY MEDICINE CLINIC | Facility: HOSPITAL | Age: 46
End: 2021-03-14

## 2021-03-14 DIAGNOSIS — Z23 ENCOUNTER FOR IMMUNIZATION: Primary | ICD-10-CM

## 2021-03-14 PROCEDURE — 91300 SARS-COV-2 / COVID-19 MRNA VACCINE (PFIZER-BIONTECH) 30 MCG: CPT

## 2021-03-14 PROCEDURE — 0001A SARS-COV-2 / COVID-19 MRNA VACCINE (PFIZER-BIONTECH) 30 MCG: CPT

## 2021-04-09 ENCOUNTER — IMMUNIZATIONS (OUTPATIENT)
Dept: FAMILY MEDICINE CLINIC | Facility: HOSPITAL | Age: 46
End: 2021-04-09

## 2021-04-09 DIAGNOSIS — Z23 ENCOUNTER FOR IMMUNIZATION: Primary | ICD-10-CM

## 2021-04-09 PROCEDURE — 91300 SARS-COV-2 / COVID-19 MRNA VACCINE (PFIZER-BIONTECH) 30 MCG: CPT

## 2021-04-09 PROCEDURE — 0002A SARS-COV-2 / COVID-19 MRNA VACCINE (PFIZER-BIONTECH) 30 MCG: CPT

## 2021-05-05 ENCOUNTER — OFFICE VISIT (OUTPATIENT)
Dept: FAMILY MEDICINE CLINIC | Facility: CLINIC | Age: 46
End: 2021-05-05
Payer: COMMERCIAL

## 2021-05-05 VITALS
SYSTOLIC BLOOD PRESSURE: 140 MMHG | RESPIRATION RATE: 16 BRPM | BODY MASS INDEX: 24.21 KG/M2 | OXYGEN SATURATION: 98 % | HEIGHT: 64 IN | WEIGHT: 141.8 LBS | DIASTOLIC BLOOD PRESSURE: 82 MMHG | TEMPERATURE: 97.4 F | HEART RATE: 71 BPM

## 2021-05-05 DIAGNOSIS — I10 BENIGN ESSENTIAL HYPERTENSION: ICD-10-CM

## 2021-05-05 DIAGNOSIS — E55.9 VITAMIN D DEFICIENCY: ICD-10-CM

## 2021-05-05 DIAGNOSIS — M45.9 ANKYLOSING SPONDYLITIS, UNSPECIFIED SITE OF SPINE (HCC): ICD-10-CM

## 2021-05-05 DIAGNOSIS — Z00.00 ANNUAL PHYSICAL EXAM: Primary | ICD-10-CM

## 2021-05-05 PROCEDURE — 99396 PREV VISIT EST AGE 40-64: CPT | Performed by: FAMILY MEDICINE

## 2021-05-05 PROCEDURE — 3008F BODY MASS INDEX DOCD: CPT | Performed by: FAMILY MEDICINE

## 2021-05-05 PROCEDURE — 1036F TOBACCO NON-USER: CPT | Performed by: FAMILY MEDICINE

## 2021-05-05 PROCEDURE — 3725F SCREEN DEPRESSION PERFORMED: CPT | Performed by: FAMILY MEDICINE

## 2021-05-05 NOTE — PROGRESS NOTES
[address]    NAME: La Patel  AGE: 54 y o  SEX: female  : 1975     DATE: 2021     Assessment and Plan:     Problem List Items Addressed This Visit        Cardiovascular and Mediastinum    Benign essential hypertension    Relevant Orders    Comprehensive metabolic panel    Lipid Panel with Direct LDL reflex    TSH, 3rd generation with Free T4 reflex    CBC and differential       Musculoskeletal and Integument    Ankylosing spondylitis (HCC)    Relevant Orders    Sedimentation rate, automated    C-reactive protein       Other    Vitamin D deficiency    Relevant Orders    Vitamin D 25 hydroxy    Annual physical exam - Primary          Immunizations and preventive care screenings were discussed with patient today  Appropriate education was printed on patient's after visit summary  Counseling:  Dental Health: discussed importance of regular tooth brushing, flossing, and dental visits  · Exercise: the importance of regular exercise/physical activity was discussed  Recommend exercise 3-5 times per week for at least 30 minutes  Return in 1 year (on 2022)  Chief Complaint:     Chief Complaint   Patient presents with    Physical Exam      History of Present Illness:     Adult Annual Physical   Patient here for a comprehensive physical exam  The patient reports problems - achy-hip,fibro  Diet and Physical Activity  · Diet/Nutrition: well balanced diet  · Exercise: walking, strength training exercises and yoga  Depression Screening  PHQ-9 Depression Screening    PHQ-9:   Frequency of the following problems over the past two weeks:      Little interest or pleasure in doing things: 0 - not at all  Feeling down, depressed, or hopeless: 0 - not at all  PHQ-2 Score: 0       General Health  · Sleep: sleeps poorly  · Hearing: normal - bilateral   · Vision: no vision problems     · Dental: regular dental visits  /GYN Health  · Patient is: postmenopausal  · Last menstrual period: hysterectomy  · Contraceptive method: n/a  Review of Systems:     Review of Systems   Constitutional: Negative  HENT: Negative  Eyes: Negative  Respiratory: Negative  Cardiovascular: Negative  Gastrointestinal: Negative  Genitourinary:        Hot lfashes   Musculoskeletal: Positive for arthralgias and back pain  Allergic/Immunologic: Positive for environmental allergies  Neurological: Positive for headaches  Hematological: Negative  Psychiatric/Behavioral: Negative         Past Medical History:     Past Medical History:   Diagnosis Date    Fibromyalgia      2 para 2     Hypertension     Melanoma ([facility] [address] )     Migraine       Past Surgical History:     Past Surgical History:   Procedure Laterality Date    COLONOSCOPY  2018    Fiberoptic    DILATION AND CURETTAGE OF UTERUS      HYSTERECTOMY  2010    HYSTEROSCOPY W/ ENDOMETRIAL ABLATION  2009    MAMMO (HISTORICAL) Bilateral 2018    NEG    OVARIAN CYST DRAINAGE      SALPINGOOPHORECTOMY Right     TONSILLECTOMY        Social History:        Social History     Socioeconomic History    Marital status: /Civil Union     Spouse name: None    Number of children: 2    Years of education: None    Highest education level: None   Occupational History    None   Social Needs    Financial resource strain: None    Food insecurity     Worry: None     Inability: None    Transportation needs     Medical: None     Non-medical: None   Tobacco Use    Smoking status: Never Smoker    Smokeless tobacco: Never Used   Substance and Sexual Activity    Alcohol use: Not Currently     Frequency: Monthly or less     Drinks per session: 1 or 2    Drug use: No    Sexual activity: Yes     Partners: Male   Lifestyle    Physical activity     Days per week: None     Minutes per session: None    Stress: None   Relationships    Social connections Talks on phone: None     Gets together: None     Attends Gnosticism service: None     Active member of club or organization: None     Attends meetings of clubs or organizations: None     Relationship status: None    Intimate partner violence     Fear of current or ex partner: None     Emotionally abused: None     Physically abused: None     Forced sexual activity: None   Other Topics Concern    None   Social History Narrative    Drinks coffee      Family History:     Family History   Problem Relation Age of Onset    Hypertension Mother         essential    Diabetes type II Mother     Diabetes Mother     Melanoma Father     Skin cancer Father     Ulcerative colitis Sister     HLA-B27 positive Sister     Asthma Family     Depression Family     Hypertension Maternal Grandmother     Esophageal cancer Maternal Grandfather     Cancer Paternal Grandmother       Current Medications:     Current Outpatient Medications   Medication Sig Dispense Refill    Cholecalciferol (VITAMIN D3) 1000 units CHEW Taking it Monday, Wednesday, Friday      MISC NATURAL PRODUCTS PO       Multiple Vitamins-Minerals (WOMENS MULTI PO) Take by mouth      nadolol (CORGARD) 20 mg tablet Take 1 tablet (20 mg total) by mouth daily 90 tablet 3     No current facility-administered medications for this visit  Allergies: Allergies   Allergen Reactions    Erythromycin Vomiting and Other (See Comments)     nausea  Reaction Date: 19Apr2011;       Physical Exam:     /82 (BP Location: Left arm)   Pulse 71   Temp (!) 97 4 °F (36 3 °C)   Resp 16   Ht 5' 3 86" (1 622 m)   Wt 64 3 kg (141 lb 12 8 oz)   SpO2 98%   BMI 24 45 kg/m²     Physical Exam  Vitals signs and nursing note reviewed  Constitutional:       Appearance: Normal appearance  She is well-developed  HENT:      Head: Normocephalic and atraumatic        Right Ear: Tympanic membrane, ear canal and external ear normal       Left Ear: Tympanic membrane, ear canal and external ear normal       Nose: Nose normal    Eyes:      Extraocular Movements: Extraocular movements intact  Conjunctiva/sclera: Conjunctivae normal       Pupils: Pupils are equal, round, and reactive to light  Neck:      Musculoskeletal: Normal range of motion and neck supple  Cardiovascular:      Rate and Rhythm: Normal rate and regular rhythm  Pulses: Normal pulses  Heart sounds: Normal heart sounds  Pulmonary:      Effort: Pulmonary effort is normal       Breath sounds: Normal breath sounds  Abdominal:      General: Abdomen is flat  Bowel sounds are normal       Palpations: Abdomen is soft  Musculoskeletal: Normal range of motion  Skin:     General: Skin is warm and dry  Capillary Refill: Capillary refill takes less than 2 seconds  Neurological:      General: No focal deficit present  Mental Status: She is alert and oriented to person, place, and time  Psychiatric:         Mood and Affect: Mood normal          Behavior: Behavior normal          Thought Content:  Thought content normal          Judgment: Judgment normal           Omaira Whitaker DO  [address] [facility]

## 2021-05-05 NOTE — PATIENT INSTRUCTIONS

## 2021-08-20 DIAGNOSIS — G43.909 MIGRAINE WITHOUT STATUS MIGRAINOSUS, NOT INTRACTABLE, UNSPECIFIED MIGRAINE TYPE: ICD-10-CM

## 2021-08-20 DIAGNOSIS — I10 ESSENTIAL HYPERTENSION: ICD-10-CM

## 2021-08-20 RX ORDER — NADOLOL 20 MG/1
TABLET ORAL
Qty: 90 TABLET | Refills: 3 | Status: SHIPPED | OUTPATIENT
Start: 2021-08-20 | End: 2022-08-09

## 2021-11-08 ENCOUNTER — OFFICE VISIT (OUTPATIENT)
Dept: FAMILY MEDICINE CLINIC | Facility: CLINIC | Age: 46
End: 2021-11-08
Payer: COMMERCIAL

## 2021-11-08 VITALS
HEART RATE: 79 BPM | SYSTOLIC BLOOD PRESSURE: 130 MMHG | WEIGHT: 146.2 LBS | DIASTOLIC BLOOD PRESSURE: 80 MMHG | HEIGHT: 64 IN | BODY MASS INDEX: 24.96 KG/M2 | TEMPERATURE: 97.3 F | OXYGEN SATURATION: 99 %

## 2021-11-08 DIAGNOSIS — M45.9 ANKYLOSING SPONDYLITIS, UNSPECIFIED SITE OF SPINE (HCC): ICD-10-CM

## 2021-11-08 DIAGNOSIS — I10 BENIGN ESSENTIAL HYPERTENSION: Primary | ICD-10-CM

## 2021-11-08 DIAGNOSIS — E55.9 VITAMIN D DEFICIENCY: ICD-10-CM

## 2021-11-08 DIAGNOSIS — G43.909 MIGRAINE WITHOUT STATUS MIGRAINOSUS, NOT INTRACTABLE, UNSPECIFIED MIGRAINE TYPE: ICD-10-CM

## 2021-11-08 PROCEDURE — 1036F TOBACCO NON-USER: CPT | Performed by: FAMILY MEDICINE

## 2021-11-08 PROCEDURE — 99214 OFFICE O/P EST MOD 30 MIN: CPT | Performed by: FAMILY MEDICINE

## 2021-11-08 PROCEDURE — 3008F BODY MASS INDEX DOCD: CPT | Performed by: FAMILY MEDICINE

## 2021-11-08 PROCEDURE — 3725F SCREEN DEPRESSION PERFORMED: CPT | Performed by: FAMILY MEDICINE

## 2021-12-13 ENCOUNTER — VBI (OUTPATIENT)
Dept: ADMINISTRATIVE | Facility: OTHER | Age: 46
End: 2021-12-13

## 2021-12-16 ENCOUNTER — ANNUAL EXAM (OUTPATIENT)
Dept: OBGYN CLINIC | Facility: CLINIC | Age: 46
End: 2021-12-16
Payer: COMMERCIAL

## 2021-12-16 VITALS
SYSTOLIC BLOOD PRESSURE: 130 MMHG | DIASTOLIC BLOOD PRESSURE: 82 MMHG | HEIGHT: 65 IN | WEIGHT: 144 LBS | BODY MASS INDEX: 23.99 KG/M2

## 2021-12-16 DIAGNOSIS — N95.1 VASOMOTOR SYMPTOMS DUE TO MENOPAUSE: ICD-10-CM

## 2021-12-16 DIAGNOSIS — Z01.419 ENCOUNTER FOR ANNUAL ROUTINE GYNECOLOGICAL EXAMINATION: ICD-10-CM

## 2021-12-16 DIAGNOSIS — Z12.31 ENCOUNTER FOR SCREENING MAMMOGRAM FOR BREAST CANCER: Primary | ICD-10-CM

## 2021-12-16 PROCEDURE — 3008F BODY MASS INDEX DOCD: CPT | Performed by: FAMILY MEDICINE

## 2021-12-16 PROCEDURE — S0612 ANNUAL GYNECOLOGICAL EXAMINA: HCPCS | Performed by: OBSTETRICS & GYNECOLOGY

## 2022-03-24 ENCOUNTER — OFFICE VISIT (OUTPATIENT)
Dept: GASTROENTEROLOGY | Facility: CLINIC | Age: 47
End: 2022-03-24
Payer: COMMERCIAL

## 2022-03-24 VITALS
WEIGHT: 146 LBS | DIASTOLIC BLOOD PRESSURE: 80 MMHG | HEART RATE: 69 BPM | OXYGEN SATURATION: 98 % | HEIGHT: 65 IN | SYSTOLIC BLOOD PRESSURE: 142 MMHG | TEMPERATURE: 98 F | BODY MASS INDEX: 24.32 KG/M2

## 2022-03-24 DIAGNOSIS — K58.0 IRRITABLE BOWEL SYNDROME WITH DIARRHEA: Primary | ICD-10-CM

## 2022-03-24 PROCEDURE — 3008F BODY MASS INDEX DOCD: CPT | Performed by: INTERNAL MEDICINE

## 2022-03-24 PROCEDURE — 1036F TOBACCO NON-USER: CPT | Performed by: INTERNAL MEDICINE

## 2022-03-24 PROCEDURE — 99204 OFFICE O/P NEW MOD 45 MIN: CPT | Performed by: INTERNAL MEDICINE

## 2022-03-24 RX ORDER — DICYCLOMINE HYDROCHLORIDE 10 MG/1
10 CAPSULE ORAL
Qty: 90 CAPSULE | Refills: 2 | Status: SHIPPED | OUTPATIENT
Start: 2022-03-24

## 2022-03-24 RX ORDER — L. RHAMNOSUS GG/INULIN 20B-200 MG
CAPSULE ORAL
COMMUNITY
Start: 2022-01-01 | End: 2022-05-10

## 2022-03-24 NOTE — PROGRESS NOTES
Consultation - [address] [facility]  Edwina Herring 51 y o  female MRN: [id number]  Unit/Bed#:  Encounter: [id number]        Consults    ASSESSMENT/PLAN:     1  Alternating diarrhea with constipation with history of irritable bowel syndrome-  -currently on probiotic   -would recommend low FODMAP diet   -would recommend trial of IB guard  -recommend exercise     -recommend dicyclomine 4 days of abdominal pain associated with diarrhea, discussed the side effect of constipation  -will need to obtain records of prior colonoscopy and EGD and of the office visit  Patient reports that she has been tested for celiac disease and is negative, will need to obtain these records as well  -CBC, CMP, sed rate and CRP were normal in July of 2021  TSH was also normal   Vitamin-D was within normal limits  -no alarming symptoms for signs to warrant any endoscopic evaluation at this time  2  Follow-up in 6 months, sooner if any change in bowel habits       ______________________________________________________________________    Reason for Consult / Principal Problem: [unfilled]    HPI: Edwina Herring is a 51y o  year old female with history of fibromyalgia, hypertension, IBS, melanoma, presents for evaluation of alternating diarrhea with constipation  Reports that she is here to establish care  Patient reports that she has been diagnosed with IBS in the past at [facility]  She reports that she has undergone EGD and colonoscopy  Reports that her last EGD and colonoscopy was in 2019  She reports that she has been tested for celiac disease in the past which was normal   She takes probiotics for IBS only  She reports that her symptoms consist mostly of alternating diarrhea with constipation, reports that these are infrequent symptoms however  Denies any rectal bleeding, melena or unintentional weight loss  Denies dysphagia, odynophagia, loss of appetite or early satiety      Blood work from May of  was notable for normal hemoglobin, normal TSH, normal sed rate and CRP  Review of Systems: The remainder of the review of systems was negative except for the pertinent positives noted in HPI  Historical Information   Past Medical History:   Diagnosis Date    Fibromyalgia      2 para 2     Hypertension     IBS (irritable bowel syndrome)     Melanoma (HCC)     Migraine      Past Surgical History:   Procedure Laterality Date    COLONOSCOPY  2018    Fiberoptic    DILATION AND CURETTAGE OF UTERUS      HYSTERECTOMY      HYSTEROSCOPY W/ ENDOMETRIAL ABLATION  2009    MAMMO (HISTORICAL) Bilateral 2018    NEG    OVARIAN CYST DRAINAGE      SALPINGOOPHORECTOMY Right     TONSILLECTOMY       Social History   Social History     Substance and Sexual Activity   Alcohol Use Yes    Comment: seldomly      Social History     Substance and Sexual Activity   Drug Use No     Social History     Tobacco Use   Smoking Status Never Smoker   Smokeless Tobacco Never Used     Family History   Problem Relation Age of Onset    Hypertension Mother         essential    Diabetes type II Mother     Diabetes Mother     Diverticulitis Mother     Melanoma Father     Skin cancer Father     Ulcerative colitis Sister     HLA-B27 positive Sister     Asthma Family     Depression Family     Hypertension Maternal Grandmother     Esophageal cancer Maternal Grandfather     Cancer Paternal Grandmother     Stomach cancer Paternal Grandmother        Meds/Allergies     (Not in a hospital admission)    No current facility-administered medications for this visit  Allergies   Allergen Reactions    Erythromycin Vomiting and Other (See Comments)     nausea  Reaction Date: 2011;        Objective     Blood pressure 142/80, pulse 69, temperature 98 °F (36 7 °C), height 5' 4 57" (1 64 m), weight 66 2 kg (146 lb), SpO2 98 %      [unfilled]    PHYSICAL EXAM     GEN: well nourished, well developed, no acute distress  HEENT: anicteric, MMM, no cervical or supraclavicular lymphadenopathy  CV: RRR, no m/r/g  CHEST: CTA b/l, no WRR  ABD: +BS, soft, NT/ND, no hepatosplenomegaly  EXT: no c/c/e  SKIN: no rashes,  NEURO: aaox3    Lab Results:   No visits with results within 1 Day(s) from this visit     Latest known visit with results is:   Orders Only on 03/29/2019   Component Date Value    TSH W/RFX TO FREE T4 04/13/2019 1 79     Total Cholesterol 04/13/2019 177     HDL 04/13/2019 67     Triglycerides 04/13/2019 89     LDL Calculated 04/13/2019 92     Chol HDLC Ratio 04/13/2019 2 6     Non-HDL Cholesterol 04/13/2019 110     White Blood Cell Count 04/13/2019 6 6     Red Blood Cell Count 04/13/2019 4 87     Hemoglobin 04/13/2019 15 0     HCT 04/13/2019 44 1     MCV 04/13/2019 90 6     MCH 04/13/2019 30 8     MCHC 04/13/2019 34 0     RDW 04/13/2019 12 2     Platelet Count 04/58/4655 328     SL AMB MPV 04/13/2019 10 2     Neutrophils (Absolute) 04/13/2019 4,033     Lymphocytes (Absolute) 04/13/2019 1,881     Monocytes (Absolute) 04/13/2019 574     Eosinophils (Absolute) 04/13/2019 53     Basophils ABS 04/13/2019 59     Neutrophils 04/13/2019 61 1     Lymphocytes 04/13/2019 28 5     Monocytes 04/13/2019 8 7     Eosinophils 04/13/2019 0 8     Basophils PCT 04/13/2019 0 9     Vitamin D, 25-Hydroxy, S* 04/13/2019 37      Imaging Studies: I have personally reviewed pertinent films in PACS                Answers for HPI/ROS submitted by the patient on 3/19/2022  Chronicity: recurrent  Onset: 1 to 4 weeks ago  Onset quality: gradual  Frequency: every several days  Episode duration: 1 days  Progression since onset: gradually improving  Pain location: RUQ, epigastric region, suprapubic region  Pain - numeric: 5/10  Pain quality: aching, burning, cramping, dull, sharp  Radiates to: LUQ, RLQ, RUQ, epigastric region, back, right flank  anorexia: No  arthralgias: Yes  belching: Yes  constipation: Yes  diarrhea: Yes  dysuria: No  fever: No  flatus: Yes  frequency: No  headaches: No  hematochezia: No  hematuria: No  melena: No  myalgias: Yes  nausea:  No  weight loss: No  vomiting: No  Aggravated by: eating  Relieved by: bowel movements, certain positions, passing flatus  Diagnostic workup: CT scan, GI consult, lower endoscopy, ultrasound, upper endoscopy

## 2022-03-24 NOTE — LETTER
March 24, 2022     Referral [address] [place] [id number]    Patient: Bi Carranza   YOB: 1975   Date of Visit: 3/24/2022       Dear Dr Mandy Starr:    Thank you for referring Bi Carranza to me for evaluation  Below are my notes for this consultation  If you have questions, please do not hesitate to call me  I look forward to following your patient along with you  Sincerely,        Thor Rocha MD        CC: Mi Doherty MD  3/24/2022  4:31 PM  Sign when Signing Visit  Consultation - [facility]  Bi Carranza 51 y o  female MRN: [id number]  Unit/Bed#:  Encounter: [id number]        Consults    ASSESSMENT/PLAN:     1  Alternating diarrhea with constipation with history of irritable bowel syndrome-  -currently on probiotic   -would recommend low FODMAP diet   -would recommend trial of IB guard  -recommend exercise     -recommend dicyclomine 4 days of abdominal pain associated with diarrhea, discussed the side effect of constipation  -will need to obtain records of prior colonoscopy and EGD and of the office visit  Patient reports that she has been tested for celiac disease and is negative, will need to obtain these records as well  -CBC, CMP, sed rate and CRP were normal in July of 2021  TSH was also normal   Vitamin-D was within normal limits  -no alarming symptoms for signs to warrant any endoscopic evaluation at this time  2  Follow-up in 6 months, sooner if any change in bowel habits       ______________________________________________________________________    Reason for Consult / Principal Problem: [unfilled]    HPI: Bi Carranza is a 51y o  year old female with history of fibromyalgia, hypertension, IBS, melanoma, presents for evaluation of alternating diarrhea with constipation  Reports that she is here to establish care    Patient reports that she has been diagnosed with IBS in the past at [facility]  She reports that she has undergone EGD and colonoscopy  Reports that her last EGD and colonoscopy was in 2019  She reports that she has been tested for celiac disease in the past which was normal   She takes probiotics for IBS only  She reports that her symptoms consist mostly of alternating diarrhea with constipation, reports that these are infrequent symptoms however  Denies any rectal bleeding, melena or unintentional weight loss  Denies dysphagia, odynophagia, loss of appetite or early satiety  Blood work from May of 2021 was notable for normal hemoglobin, normal TSH, normal sed rate and CRP  Review of Systems: The remainder of the review of systems was negative except for the pertinent positives noted in HPI       Historical Information   Past Medical History:   Diagnosis Date    Fibromyalgia      2 para 2     Hypertension     IBS (irritable bowel syndrome)     Melanoma (HCC)     Migraine      Past Surgical History:   Procedure Laterality Date    COLONOSCOPY  2018    Fiberoptic    DILATION AND CURETTAGE OF UTERUS      HYSTERECTOMY  2010    HYSTEROSCOPY W/ ENDOMETRIAL ABLATION  2009    MAMMO (HISTORICAL) Bilateral 2018    NEG    OVARIAN CYST DRAINAGE      SALPINGOOPHORECTOMY Right     TONSILLECTOMY       Social History   Social History     Substance and Sexual Activity   Alcohol Use Yes    Comment: seldomly      Social History     Substance and Sexual Activity   Drug Use No     Social History     Tobacco Use   Smoking Status Never Smoker   Smokeless Tobacco Never Used     Family History   Problem Relation Age of Onset    Hypertension Mother         essential    Diabetes type II Mother     Diabetes Mother     Diverticulitis Mother     Melanoma Father     Skin cancer Father     Ulcerative colitis Sister     HLA-B27 positive Sister     Asthma Family     Depression Family     Hypertension Maternal Grandmother     Esophageal cancer Maternal Grandfather     Cancer Paternal Grandmother     Stomach cancer Paternal Grandmother        Meds/Allergies     (Not in a hospital admission)    No current facility-administered medications for this visit  Allergies   Allergen Reactions    Erythromycin Vomiting and Other (See Comments)     nausea  Reaction Date: 19Apr2011;        Objective     Blood pressure 142/80, pulse 69, temperature 98 °F (36 7 °C), height 5' 4 57" (1 64 m), weight 66 2 kg (146 lb), SpO2 98 %  [unfilled]    PHYSICAL EXAM     GEN: well nourished, well developed, no acute distress  HEENT: anicteric, MMM, no cervical or supraclavicular lymphadenopathy  CV: RRR, no m/r/g  CHEST: CTA b/l, no WRR  ABD: +BS, soft, NT/ND, no hepatosplenomegaly  EXT: no c/c/e  SKIN: no rashes,  NEURO: aaox3    Lab Results:   No visits with results within 1 Day(s) from this visit     Latest known visit with results is:   Orders Only on 03/29/2019   Component Date Value    TSH W/RFX TO FREE T4 04/13/2019 1 79     Total Cholesterol 04/13/2019 177     HDL 04/13/2019 67     Triglycerides 04/13/2019 89     LDL Calculated 04/13/2019 92     Chol HDLC Ratio 04/13/2019 2 6     Non-HDL Cholesterol 04/13/2019 110     White Blood Cell Count 04/13/2019 6 6     Red Blood Cell Count 04/13/2019 4 87     Hemoglobin 04/13/2019 15 0     HCT 04/13/2019 44 1     MCV 04/13/2019 90 6     MCH 04/13/2019 30 8     MCHC 04/13/2019 34 0     RDW 04/13/2019 12 2     Platelet Count 26/91/5748 328     SL AMB MPV 04/13/2019 10 2     Neutrophils (Absolute) 04/13/2019 4,033     Lymphocytes (Absolute) 04/13/2019 1,881     Monocytes (Absolute) 04/13/2019 574     Eosinophils (Absolute) 04/13/2019 53     Basophils ABS 04/13/2019 59     Neutrophils 04/13/2019 61 1     Lymphocytes 04/13/2019 28 5     Monocytes 04/13/2019 8 7     Eosinophils 04/13/2019 0 8     Basophils PCT 04/13/2019 0 9     Vitamin D, 25-Hydroxy, S* 04/13/2019 37      Imaging Studies: I have personally reviewed pertinent films in PACS                Answers for HPI/ROS submitted by the patient on 3/19/2022  Chronicity: recurrent  Onset: 1 to 4 weeks ago  Onset quality: gradual  Frequency: every several days  Episode duration: 1 days  Progression since onset: gradually improving  Pain location: RUQ, epigastric region, suprapubic region  Pain - numeric: 5/10  Pain quality: aching, burning, cramping, dull, sharp  Radiates to: LUQ, RLQ, RUQ, epigastric region, back, right flank  anorexia: No  arthralgias: Yes  belching: Yes  constipation: Yes  diarrhea: Yes  dysuria: No  fever: No  flatus: Yes  frequency: No  headaches: No  hematochezia: No  hematuria: No  melena: No  myalgias: Yes  nausea:  No  weight loss: No  vomiting: No  Aggravated by: eating  Relieved by: bowel movements, certain positions, passing flatus  Diagnostic workup: CT scan, GI consult, lower endoscopy, ultrasound, upper endoscopy

## 2022-05-10 ENCOUNTER — OFFICE VISIT (OUTPATIENT)
Dept: FAMILY MEDICINE CLINIC | Facility: CLINIC | Age: 47
End: 2022-05-10
Payer: COMMERCIAL

## 2022-05-10 VITALS
RESPIRATION RATE: 13 BRPM | HEART RATE: 68 BPM | OXYGEN SATURATION: 98 % | SYSTOLIC BLOOD PRESSURE: 114 MMHG | BODY MASS INDEX: 24.79 KG/M2 | DIASTOLIC BLOOD PRESSURE: 80 MMHG | WEIGHT: 148.8 LBS | HEIGHT: 65 IN | TEMPERATURE: 98.5 F

## 2022-05-10 DIAGNOSIS — M45.9 ANKYLOSING SPONDYLITIS, UNSPECIFIED SITE OF SPINE (HCC): ICD-10-CM

## 2022-05-10 DIAGNOSIS — Z00.00 ANNUAL PHYSICAL EXAM: Primary | ICD-10-CM

## 2022-05-10 DIAGNOSIS — M81.6 LOCALIZED OSTEOPOROSIS WITHOUT CURRENT PATHOLOGICAL FRACTURE: ICD-10-CM

## 2022-05-10 DIAGNOSIS — I10 BENIGN ESSENTIAL HYPERTENSION: ICD-10-CM

## 2022-05-10 DIAGNOSIS — Z11.59 NEED FOR HEPATITIS C SCREENING TEST: ICD-10-CM

## 2022-05-10 DIAGNOSIS — M25.531 RIGHT WRIST PAIN: ICD-10-CM

## 2022-05-10 PROCEDURE — 3008F BODY MASS INDEX DOCD: CPT | Performed by: FAMILY MEDICINE

## 2022-05-10 PROCEDURE — 99396 PREV VISIT EST AGE 40-64: CPT | Performed by: FAMILY MEDICINE

## 2022-05-10 PROCEDURE — 3725F SCREEN DEPRESSION PERFORMED: CPT | Performed by: FAMILY MEDICINE

## 2022-05-10 PROCEDURE — 1036F TOBACCO NON-USER: CPT | Performed by: FAMILY MEDICINE

## 2022-05-10 NOTE — PATIENT INSTRUCTIONS

## 2022-05-10 NOTE — PROGRESS NOTES
[address] [facility] [address]    NAME: Shadia Vazquez  AGE: 51 y o  SEX: female  : 1975     DATE: 5/10/2022     Assessment and Plan:     Problem List Items Addressed This Visit        Cardiovascular and Mediastinum    Benign essential hypertension     stable            Musculoskeletal and Integument    Localized osteoporosis without current pathological fracture    Relevant Orders    DXA bone density spine hip and pelvis    Ankylosing spondylitis ([address] )     stable            Other    Annual physical exam - Primary     Labs and mammogram up to date  dexa ordered         Right wrist pain     With numbness of fingers  Possible carpel tunnel           Other Visit Diagnoses     Need for hepatitis C screening test        Relevant Orders    Hepatitis C Antibody ([facility])          Immunizations and preventive care screenings were discussed with patient today  Appropriate education was printed on patient's after visit summary  Counseling:  Dental Health: discussed importance of regular tooth brushing, flossing, and dental visits  BMI Counseling: Body mass index is 25 09 kg/m²  The BMI is above normal  Nutrition recommendations include encouraging healthy choices of fruits and vegetables  Rationale for BMI follow-up plan is due to patient being overweight or obese  Depression Screening and Follow-up Plan: Patient was screened for depression during today's encounter  They screened negative with a PHQ-2 score of 0  Return in 6 months (on 11/10/2022) for Recheck  Chief Complaint:     Chief Complaint   Patient presents with    Annual Exam     Patient here for annual wellness exam       History of Present Illness:     Adult Annual Physical   Patient here for a comprehensive physical exam  The patient reports problems - start IB guard and bentyl  Diet and Physical Activity  Diet/Nutrition: well balanced diet     Exercise: 5-7 times a week on average  Depression Screening  PHQ-2/9 Depression Screening    Little interest or pleasure in doing things: 0 - not at all  Feeling down, depressed, or hopeless: 0 - not at all  PHQ-2 Score: 0  PHQ-2 Interpretation: Negative depression screen       General Health  Sleep: sleeps poorly  Hearing: normal - bilateral   Vision: wears glasses  Dental: brushes teeth twice daily  /GYN Health  Patient is: hysterectomy  Last menstrual period: hysterectomy  Contraceptive method: n/a  Review of Systems:     Review of Systems   Constitutional: Negative  HENT: Negative  Eyes: Negative  Respiratory: Negative  Cardiovascular: Negative  Gastrointestinal: Negative  Endocrine: Negative  Genitourinary:        Hot flashes   Musculoskeletal: Positive for arthralgias  Allergic/Immunologic: Negative  Neurological: Positive for headaches (intermittent)  Hematological: Negative  Psychiatric/Behavioral: Negative  Past Medical History:     Past Medical History:   Diagnosis Date    Fibromyalgia      2 para 2     Hypertension     IBS (irritable bowel syndrome)     Melanoma (HCC)     Migraine       Past Surgical History:     Past Surgical History:   Procedure Laterality Date    COLONOSCOPY  2018    Fiberoptic    DILATION AND CURETTAGE OF UTERUS      HYSTERECTOMY  2010    HYSTEROSCOPY W/ ENDOMETRIAL ABLATION  2009    MAMMO (HISTORICAL) Bilateral 2018    NEG    OVARIAN CYST DRAINAGE      SALPINGOOPHORECTOMY Right     TONSILLECTOMY        Social History:     Social History     Socioeconomic History    Marital status: /Civil Union     Spouse name: None    Number of children: 2    Years of education: None    Highest education level: None   Occupational History    None   Tobacco Use    Smoking status: Never Smoker    Smokeless tobacco: Never Used   Vaping Use    Vaping Use: Never used   Substance and Sexual Activity    Alcohol use:  Yes Comment: seldomly     Drug use: No    Sexual activity: Yes     Partners: Male   Other Topics Concern    None   Social History Narrative    Drinks coffee     Social Determinants of Health     Financial Resource Strain: Not on file   Food Insecurity: Not on file   Transportation Needs: Not on file   Physical Activity: Not on file   Stress: Not on file   Social Connections: Not on file   Intimate Partner Violence: Not on file   Housing Stability: Not on file      Family History:     Family History   Problem Relation Age of Onset    Hypertension Mother         essential    Diabetes type II Mother     Diabetes Mother     Diverticulitis Mother     Melanoma Father     Skin cancer Father     Ulcerative colitis Sister     HLA-B27 positive Sister     Asthma Family     Depression Family     Hypertension Maternal Grandmother     Esophageal cancer Maternal Grandfather     Cancer Paternal Grandmother     Stomach cancer Paternal Grandmother       Current Medications:     Current Outpatient Medications   Medication Sig Dispense Refill    Cholecalciferol (VITAMIN D3) 1000 units CHEW Taking it Monday, Wednesday, Friday      dicyclomine (BENTYL) 10 mg capsule Take 1 capsule (10 mg total) by mouth 3 (three) times a day before meals (Patient taking differently: Take 10 mg by mouth 3 (three) times a day as needed  ) 90 capsule 2    Multiple Vitamins-Minerals (WOMENS MULTI PO) Take by mouth      nadolol (CORGARD) 20 mg tablet TAKE 1 TABLET BY MOUTH EVERY DAY 90 tablet 3     No current facility-administered medications for this visit  Allergies:      Allergies   Allergen Reactions    Erythromycin Vomiting and Other (See Comments)     nausea  Reaction Date: 19Apr2011;       Physical Exam:     /80 (BP Location: Left arm, Patient Position: Sitting, Cuff Size: Standard)   Pulse 68   Temp 98 5 °F (36 9 °C)   Resp 13   Ht 5' 4 57" (1 64 m)   Wt 67 5 kg (148 lb 12 8 oz)   SpO2 98%   BMI 25 09 kg/m² Physical Exam  Vitals and nursing note reviewed  Constitutional:       Appearance: She is well-developed  HENT:      Head: Normocephalic and atraumatic  Right Ear: Tympanic membrane, ear canal and external ear normal       Left Ear: Tympanic membrane, ear canal and external ear normal  There is impacted cerumen  Nose: Nose normal    Eyes:      Extraocular Movements: Extraocular movements intact  Conjunctiva/sclera: Conjunctivae normal       Pupils: Pupils are equal, round, and reactive to light  Cardiovascular:      Rate and Rhythm: Normal rate and regular rhythm  Heart sounds: Normal heart sounds  Pulmonary:      Effort: Pulmonary effort is normal       Breath sounds: Normal breath sounds  Abdominal:      General: Abdomen is flat  Bowel sounds are normal       Palpations: Abdomen is soft  Musculoskeletal:         General: Normal range of motion  Cervical back: Normal range of motion and neck supple  Skin:     General: Skin is warm and dry  Capillary Refill: Capillary refill takes less than 2 seconds  Neurological:      General: No focal deficit present  Mental Status: She is alert and oriented to person, place, and time  Psychiatric:         Mood and Affect: Mood normal          Behavior: Behavior normal          Thought Content:  Thought content normal          Judgment: Judgment normal           Lindsey Decrebrigitte, DO  [address] [facility]

## 2022-06-06 DIAGNOSIS — Z13.820 SCREENING FOR OSTEOPOROSIS: Primary | ICD-10-CM

## 2022-06-06 DIAGNOSIS — Z91.89 AT HIGH RISK FOR OSTEOPOROSIS: ICD-10-CM

## 2022-06-23 DIAGNOSIS — Z91.89 AT HIGH RISK FOR OSTEOPOROSIS: ICD-10-CM

## 2022-06-23 DIAGNOSIS — Z13.820 SCREENING FOR OSTEOPOROSIS: ICD-10-CM

## 2022-08-09 DIAGNOSIS — G43.909 MIGRAINE WITHOUT STATUS MIGRAINOSUS, NOT INTRACTABLE, UNSPECIFIED MIGRAINE TYPE: ICD-10-CM

## 2022-08-09 DIAGNOSIS — I10 ESSENTIAL HYPERTENSION: ICD-10-CM

## 2022-08-09 RX ORDER — NADOLOL 20 MG/1
TABLET ORAL
Qty: 90 TABLET | Refills: 3 | Status: SHIPPED | OUTPATIENT
Start: 2022-08-09

## 2022-09-20 ENCOUNTER — OFFICE VISIT (OUTPATIENT)
Dept: GASTROENTEROLOGY | Facility: AMBULARY SURGERY CENTER | Age: 47
End: 2022-09-20
Payer: COMMERCIAL

## 2022-09-20 VITALS
OXYGEN SATURATION: 100 % | SYSTOLIC BLOOD PRESSURE: 122 MMHG | HEIGHT: 65 IN | WEIGHT: 152 LBS | HEART RATE: 79 BPM | DIASTOLIC BLOOD PRESSURE: 82 MMHG | BODY MASS INDEX: 25.33 KG/M2

## 2022-09-20 DIAGNOSIS — K58.2 IRRITABLE BOWEL SYNDROME WITH BOTH CONSTIPATION AND DIARRHEA: Primary | ICD-10-CM

## 2022-09-20 DIAGNOSIS — Z12.11 COLON CANCER SCREENING: ICD-10-CM

## 2022-09-20 PROCEDURE — 99214 OFFICE O/P EST MOD 30 MIN: CPT | Performed by: PHYSICIAN ASSISTANT

## 2022-09-20 NOTE — PROGRESS NOTES
Assessment and Plan    #1  IBS with alternating diarrhea and constipation: has alternating bowel habits, tries to avoid trigger foods, on probiotic, never tried fiber  -recommended trial of fiber daily   -continue probiotic  -continue to avoid trigger foods  -continue to use bentyl as needed for cramping and loose stools  -follow up in 1 year, sooner if needed    #2  Colon cancer screening: had colonoscopy in 2018 which was normal aside from hemorrhoids  -due for repeat in 2028    --------------------------------------------------------------------------------------------------------------------    Chief Complaint: f/u IBS    HPI: Obinna Salomon is a 51 y o  female with a history IBS, hypertension, migraine, ankylosing spondylitis, and fibromyalgia who presents today for follow up for IBS  Patient reports that she is doing about the same as when she was here about 6 months ago  She has alternating bowel habits which can go between constipation and loose stool  She reports that the occasionally when she has loose stool she will have urgency  She uses dicyclomine if she develops cramping after eating or sometimes will take it before a meal if she feels that she may get symptoms  She denies any nausea, vomiting, heartburn, trouble swallowing  She reports no significant abdominal pain aside from when she gets the cramping associated with her IBS  She tries to avoid trigger foods  She takes a probiotic daily but has not tried doing a fiber supplement in the past   Denies any blood in the stool or black tarry stools  She had an EGD and colonoscopy done in 2018 at [facility]  Her colonoscopy was normal aside from some hemorrhoids was recommended for repeat in 10 years    Denies any family history of colon cancer but she does have a sister with ulcerative colitis and also has grandparents with diagnosis of stomach cancer and esophageal cancer in the past       Review of Systems:   General: negative for fatigue, fever, night sweats or unexpected weight loss  Psychological: negative for anxiety or depression  Ophthalmic: negative for blurry vision or scleral icterus  ENT: negative for headaches, oral lesions, sore throat, vocal changes or dysphagia  Hematological and Lymphatic: negative for pallor or swollen lymph nodes  Respiratory: negative for cough, shortness of breath or wheezing  Cardiovascular: negative for chest pain, edema or murmur  Gastrointestinal: as mentioned in HPI  Genito-Urinary: negative for dysuria or incontinence  Musculoskeletal: negative for joint pain, joint stiffness or joint swelling  Dermatological: negative for pruritus, rash, or jaundice    Current Medications  Current Outpatient Medications   Medication Sig Dispense Refill    Cholecalciferol (VITAMIN D3) 1000 units CHEW Taking it Monday, Wednesday, Friday      dicyclomine (BENTYL) 10 mg capsule Take 1 capsule (10 mg total) by mouth 3 (three) times a day before meals (Patient taking differently: Take 10 mg by mouth 3 (three) times a day as needed) 90 capsule 2    Lactobacillus (ACIDOPHILUS/BIFIDUS PO) Take by mouth      Multiple Vitamins-Minerals (WOMENS MULTI PO) Take by mouth      nadolol (CORGARD) 20 mg tablet TAKE 1 TABLET BY MOUTH EVERY DAY 90 tablet 3     No current facility-administered medications for this visit         Past Medical History  Past Medical History:   Diagnosis Date    Fibromyalgia      2 para 2     Hypertension     IBS (irritable bowel syndrome)     Melanoma ([address] )     Migraine        Past Surgical History  Past Surgical History:   Procedure Laterality Date    COLONOSCOPY      Fiberoptic    DILATION AND CURETTAGE OF UTERUS      HYSTERECTOMY  2010    HYSTEROSCOPY W/ ENDOMETRIAL ABLATION  2009    MAMMO (HISTORICAL) Bilateral 2018    NEG    OVARIAN CYST DRAINAGE      SALPINGOOPHORECTOMY Right     TONSILLECTOMY         Past Social History   Social History     Socioeconomic History    Marital status: /Civil [employer]     Spouse name: Not on file    Number of children: 2    Years of education: Not on file    Highest education level: Not on file   Occupational History    Not on file   Tobacco Use    Smoking status: Never Smoker    Smokeless tobacco: Never Used   Vaping Use    Vaping Use: Never used   Substance and Sexual Activity    Alcohol use: Yes     Comment: seldomly     Drug use: No    Sexual activity: Yes     Partners: Male   Other Topics Concern    Not on file   Social History Narrative    Drinks coffee     Social Determinants of Health     Financial Resource Strain: Not on file   Food Insecurity: Not on file   Transportation Needs: Not on file   Physical Activity: Not on file   Stress: Not on file   Social Connections: Not on file   Intimate Partner Violence: Not on file   Housing Stability: Not on file       The following portions of the patient's history were reviewed and updated as appropriate: allergies, current medications, past family history, past medical history, past social history, past surgical history and problem list     Vital Signs  Vitals:    09/20/22 0846   BP: 122/82   BP Location: Right arm   Patient Position: Sitting   Cuff Size: Standard   Pulse: 79   SpO2: 100%   Weight: 68 9 kg (152 lb)   Height: 5' 4 57" (1 64 m)       Physical Exam:  General appearance: alert, cooperative, no distress  HEENT: normocephalic, anicteric, no eye erythema or discharge, no oropharyngeal thrush  Neck: supple, trachea midline, no adenopathy  Lungs: CTA b/l, no rales, rhonchi, or wheezing, unlabored respirations  Heart: RRR, no murmur, rubs, or gallops  Abdomen: soft, non-tender, non-distended, normal bowel sounds, no masses or organomegaly  Rectal: deferred  Extremities: no cyanosis, clubbing, or edema  Musculoskeletal: normal gait  Skin: color and texture normal, no jaundice, no rashes or lesions  Psychiatric: alert and oriented, normal affect and behavior

## 2022-10-17 ENCOUNTER — VBI (OUTPATIENT)
Dept: ADMINISTRATIVE | Facility: OTHER | Age: 47
End: 2022-10-17

## 2022-11-11 ENCOUNTER — RA CDI HCC (OUTPATIENT)
Dept: OTHER | Facility: HOSPITAL | Age: 47
End: 2022-11-11

## 2022-11-11 NOTE — PROGRESS NOTES
[facility]  coding opportunities       Chart reviewed, no opportunity found: CHART REVIEWED, NO OPPORTUNITY FOUND        Patients Insurance        Commercial Insurance: [address] [place] [address]

## 2022-11-21 ENCOUNTER — OFFICE VISIT (OUTPATIENT)
Dept: FAMILY MEDICINE CLINIC | Facility: CLINIC | Age: 47
End: 2022-11-21

## 2022-11-21 VITALS
WEIGHT: 153.4 LBS | TEMPERATURE: 96.9 F | HEIGHT: 65 IN | BODY MASS INDEX: 25.56 KG/M2 | SYSTOLIC BLOOD PRESSURE: 124 MMHG | HEART RATE: 70 BPM | OXYGEN SATURATION: 99 % | DIASTOLIC BLOOD PRESSURE: 84 MMHG | RESPIRATION RATE: 14 BRPM

## 2022-11-21 DIAGNOSIS — G43.909 MIGRAINE WITHOUT STATUS MIGRAINOSUS, NOT INTRACTABLE, UNSPECIFIED MIGRAINE TYPE: ICD-10-CM

## 2022-11-21 DIAGNOSIS — M81.6 LOCALIZED OSTEOPOROSIS WITHOUT CURRENT PATHOLOGICAL FRACTURE: ICD-10-CM

## 2022-11-21 DIAGNOSIS — E55.9 VITAMIN D DEFICIENCY: ICD-10-CM

## 2022-11-21 DIAGNOSIS — I10 BENIGN ESSENTIAL HYPERTENSION: Primary | ICD-10-CM

## 2022-11-21 NOTE — PROGRESS NOTES
Assessment/Plan:    1  Benign essential hypertension  Assessment & Plan:  Stable on naldolol    Orders:  -     CBC; Future; Expected date: 05/01/2023  -     Comprehensive metabolic panel; Future; Expected date: 05/01/2023  -     Lipid Panel with Direct LDL reflex; Future; Expected date: 05/01/2023  -     TSH, 3rd generation with Free T4 reflex; Future; Expected date: 05/01/2023    2  Migraine without status migrainosus, not intractable, unspecified migraine type  Assessment & Plan:  Stable on nadolol    Orders:  -     TSH, 3rd generation with Free T4 reflex; Future; Expected date: 05/01/2023    3  Localized osteoporosis without current pathological fracture  Assessment & Plan:  No guidnace from rheum  Will refer to endocrine  Had fracture in 2008 that didn't heal well    Orders:  -     Ambulatory Referral to Endocrinology; Future  -     Vitamin D 25 hydroxy; Future; Expected date: 05/01/2023    4  Vitamin D deficiency  -     Vitamin D 25 hydroxy; Future; Expected date: 05/01/2023        Depression Screening and Follow-up Plan: Patient was screened for depression during today's encounter  They screened negative with a PHQ-2 score of 0  There are no Patient Instructions on file for this visit  Return for Annual physical     Subjective:      Patient ID: Joy Medley is a 49 y o  female  Chief Complaint   Patient presents with   • Follow-up     Patient here for 6 month follow up       Here for follow up  Overall feeling well [facility]  Seen by Dr Dave Avery years ago for bone health  In menopause now, joint pain possible related to that    Hypertension  This is a chronic problem  The current episode started more than 1 year ago  The problem is unchanged  There are no associated agents to hypertension  Past treatments include beta blockers  The current treatment provides significant improvement  There are no compliance problems          The following portions of the patient's history were reviewed and updated as appropriate: allergies, current medications, past family history, past medical history, past social history, past surgical history and problem list     Review of Systems   Constitutional: Negative  HENT: Negative  Eyes: Negative  Respiratory: Negative  Cardiovascular: Negative  Gastrointestinal: Negative  Endocrine: Negative  Genitourinary: Negative  Skin:        Thumb line on finger   Allergic/Immunologic: Negative  Neurological: Negative  Hematological: Negative  Psychiatric/Behavioral: Negative  Current Outpatient Medications   Medication Sig Dispense Refill   • CALCIUM PO Take by mouth     • Cholecalciferol (VITAMIN D3) 1000 units CHEW Taking it Monday, Wednesday, Friday     • dicyclomine (BENTYL) 10 mg capsule Take 1 capsule (10 mg total) by mouth 3 (three) times a day before meals (Patient taking differently: Take 10 mg by mouth 3 (three) times a day as needed) 90 capsule 2   • ELDERBERRY PO Take by mouth     • Lactobacillus (ACIDOPHILUS/BIFIDUS PO) Take by mouth     • Multiple Vitamins-Minerals (WOMENS MULTI PO) Take by mouth     • nadolol (CORGARD) 20 mg tablet TAKE 1 TABLET BY MOUTH EVERY DAY 90 tablet 3     No current facility-administered medications for this visit  Objective:    /84 (BP Location: Left arm, Patient Position: Sitting, Cuff Size: Standard)   Pulse 70   Temp (!) 96 9 °F (36 1 °C) (Tympanic)   Resp 14   Ht 5' 4 57" (1 64 m)   Wt 69 6 kg (153 lb 6 4 oz)   SpO2 99%   BMI 25 87 kg/m²        Physical Exam  Vitals and nursing note reviewed  Constitutional:       Appearance: Normal appearance  HENT:      Head: Normocephalic and atraumatic  Eyes:      Extraocular Movements: Extraocular movements intact  Pupils: Pupils are equal, round, and reactive to light  Cardiovascular:      Rate and Rhythm: Normal rate and regular rhythm  Pulses: Normal pulses  Heart sounds: Normal heart sounds     Pulmonary:      Effort: Pulmonary effort is normal       Breath sounds: Normal breath sounds  Abdominal:      General: Abdomen is flat  Palpations: Abdomen is soft  Musculoskeletal:      Cervical back: Normal range of motion and neck supple  Skin:     General: Skin is warm  Capillary Refill: Capillary refill takes less than 2 seconds  Comments: Right thumb with faint brown linear line   Neurological:      General: No focal deficit present  Mental Status: She is alert and oriented to person, place, and time  Psychiatric:         Mood and Affect: Mood normal          Behavior: Behavior normal          Thought Content:  Thought content normal          Judgment: Judgment normal                 Crispin Bucio, DO

## 2022-12-22 ENCOUNTER — ANNUAL EXAM (OUTPATIENT)
Dept: GYNECOLOGY | Facility: CLINIC | Age: 47
End: 2022-12-22

## 2022-12-22 VITALS
SYSTOLIC BLOOD PRESSURE: 140 MMHG | DIASTOLIC BLOOD PRESSURE: 80 MMHG | WEIGHT: 153 LBS | HEIGHT: 64 IN | BODY MASS INDEX: 26.12 KG/M2

## 2022-12-22 DIAGNOSIS — Z01.419 ENCOUNTER FOR ANNUAL ROUTINE GYNECOLOGICAL EXAMINATION: ICD-10-CM

## 2022-12-22 DIAGNOSIS — Z12.31 SCREENING MAMMOGRAM FOR BREAST CANCER: Primary | ICD-10-CM

## 2022-12-22 DIAGNOSIS — N64.4 BREAST PAIN, LEFT: ICD-10-CM

## 2022-12-22 NOTE — PROGRESS NOTES
Assessment/Plan:    Left breast pain  Hysterectomy  Has left ovary  Menopausal  Normal mammogram 2022  Normal colonoscopy 2018 due for repeat in 5 years  History of melanoma  DEXA scan showing osteoporosis 2022  Flu vaccine 2022  COVID-vaccine and booster  COVID infection May 2022    Plan: Rx diagnostic bilateral mammogram   Continue healthy diet and weightbearing exercise  Continue vitamin D and calcium  Subjective: G2, P2  x2     Patient ID: Pooja Villafana is a 49 y o  female Haley Babe for annual exam with no complaints except left breast pain which started at the end of November  It has not gotten worse but has not gone away  No history of trauma to the breast   No skin changes or nipple discharge  No axillary nodes  Normal mammogram 2022  Family history of breast cancer (maternal grand aunt)  Medications reviewed  Review of Systems   Constitutional: Negative  Negative for fatigue, fever and unexpected weight change  HENT: Negative  Eyes: Negative  Respiratory: Negative  Negative for chest tightness, shortness of breath, wheezing and stridor  Cardiovascular: Negative  Negative for chest pain, palpitations and leg swelling  Gastrointestinal: Negative  Negative for abdominal pain, blood in stool, diarrhea, nausea, rectal pain and vomiting  Endocrine: Negative  Genitourinary: Negative for dysuria, frequency, vaginal bleeding, vaginal discharge and vaginal pain  Left breast pain   Musculoskeletal: Negative  Skin: Negative  Allergic/Immunologic: Negative  Neurological: Negative  Hematological: Negative  Psychiatric/Behavioral: Negative  All other systems reviewed and are negative  Objective:      /80   Ht 5' 4" (1 626 m)   Wt 69 4 kg (153 lb)   BMI 26 26 kg/m²          Physical Exam  Constitutional:       Appearance: She is well-developed     Cardiovascular:      Rate and Rhythm: Normal rate and regular rhythm  Heart sounds: Normal heart sounds  Pulmonary:      Effort: Pulmonary effort is normal  No respiratory distress  Breath sounds: No stridor  No wheezing or rales  Chest:      Chest wall: No tenderness  Breasts:     Breasts are symmetrical       Right: Normal  No inverted nipple, mass, nipple discharge, skin change or tenderness  Left: Normal  No inverted nipple, mass, nipple discharge, skin change or tenderness  Comments: Area of concern  Abdominal:      General: Bowel sounds are normal  There is no distension  Palpations: Abdomen is soft  There is no mass  Tenderness: There is no abdominal tenderness  There is no guarding or rebound  Hernia: No hernia is present  There is no hernia in the left inguinal area  Genitourinary:     Labia:         Right: No rash, tenderness, lesion or injury  Left: No rash, tenderness, lesion or injury  Vagina: Normal  No signs of injury and foreign body  No vaginal discharge, erythema, tenderness or bleeding  Adnexa:         Right: No mass, tenderness or fullness  Left: No mass, tenderness or fullness  Rectum: No mass, tenderness, anal fissure, external hemorrhoid or internal hemorrhoid  Normal anal tone  Comments: Urethral meatus normal   Vaginal cuff well supported  No cystocele or rectocele  Cervix uterus tubes and right ovary absent  Lymphadenopathy:      Lower Body: No right inguinal adenopathy  No left inguinal adenopathy  Psychiatric:         Behavior: Behavior normal          Thought Content:  Thought content normal          Judgment: Judgment normal

## 2022-12-27 DIAGNOSIS — N64.4 BREAST PAIN, LEFT: ICD-10-CM

## 2023-01-24 PROBLEM — U07.1 COVID-19: Status: ACTIVE | Noted: 2023-01-24

## 2023-03-27 ENCOUNTER — CONSULT (OUTPATIENT)
Dept: ENDOCRINOLOGY | Facility: CLINIC | Age: 48
End: 2023-03-27

## 2023-03-27 VITALS
BODY MASS INDEX: 27.02 KG/M2 | WEIGHT: 157.4 LBS | DIASTOLIC BLOOD PRESSURE: 92 MMHG | SYSTOLIC BLOOD PRESSURE: 138 MMHG | HEART RATE: 73 BPM

## 2023-03-27 DIAGNOSIS — M81.6 LOCALIZED OSTEOPOROSIS WITHOUT CURRENT PATHOLOGICAL FRACTURE: Primary | ICD-10-CM

## 2023-03-27 DIAGNOSIS — E55.9 VITAMIN D DEFICIENCY: ICD-10-CM

## 2023-03-27 NOTE — PROGRESS NOTES
Assessment/Plan:     1  Vitamin D deficiency  -     Vitamin D 25 hydroxy Lab Collect; Future; Expected date: 03/27/2024    2  Localized osteoporosis without current pathological fracture  -     Ambulatory Referral to Endocrinology  -     Vitamin D 25 hydroxy; Future; Expected date: 03/27/2023  -     PTH, intact; Future; Expected date: 03/27/2023  -     Protein electrophoresis, serum; Future; Expected date: 03/27/2023  -     Protein electrophoresis, urine; Future; Expected date: 03/27/2023  -     TSH + Free T4; Future; Expected date: 03/27/2023  -     Basic metabolic panel; Future; Expected date: 03/27/2023  -     Celiac Disease Antibody Profile; Future; Expected date: 06/27/2023  -     Basic metabolic panel; Future; Expected date: 03/27/2024  -     Vitamin D 25 hydroxy Lab Collect; Future; Expected date: 03/27/2024  -     NTX Panel, Urine; Future; Expected date: 03/27/2024  -     NTX Panel, Urine; Future; Expected date: 03/27/2023        Mild Osteoporosis   Rule out Celiac, MM, Hypothyroidism, contributing also Intermittent Prednisone use + Rt ovary removal 2010  Take Vit D 2000 daily   Take Ca 600 daily  Labs as above   IV Bisphosphates ( avoid oral as she has esophageal reflux), take rest after IV Reclast, counseled on the side effects such as allergy, fatigue, jaw bone necrosis (she does not have dental work planned)  DEXA in 2024, if improved osteoporosis may give bisphosphates drug holiday   Would not start Prolia as she is young and will need it long term  Exercises provided      Subjective:      Patient ID: Shantelle Simon is a 49 y o  female  HPI     CC:  osteoporosis    History of Present Illness      HPI:  Shantelle Simon is a 49 y o  female who is here for new patient visit for evaluation of osteoporosis  Dr Vika Rossi PCP ordered DEXA last year when she was diagnosed with osteoporosis due to T score of -2 5 on her femur    Osteopenia since early 2000's worked up by rheumatologist Dr Naty Hurd related to AS not on medication and was told to follow-up  Was referred here by Dr Radha Juarez  History of fibromyalgia, esophageal reflux, pretension, migraines  Treatment History:  Calcium 600 mg 1 tablet 3 times weekly  woMen's One-A-Day multivitamin daily  vitamin D 2000 international units 3 times weekly        Previous fractures: Yes -left fifth metatarsal and 2008  h/o loss of 2 inches of adult height - No  H/o Falls No  H/o malabsorption Yes -IBS C/D  H/o nephrolithiasisNo  H/o Rheumatoid Arthritis No  H/o hyperthyroidism No  Family History of Osteoporosis No    Contributing Drugs:  glucocorticoids:  Ever:  for Joint inflammation has used prednisone intermittently but gets severe headaches since early 2000's, last use several years ago  PPI                               No  Immunnosuppressants No                                             Antiestrogens               No                                              Antiandrogens             No  Lithium                         No                                                       Anticonvulsant             No    Diet:     lactose intolerance No  calcium intake as an adult (diet and supplements) as above                                   vitamin D intake  as above    Tobacco use: No  ETOH use >2 units/day:     No     Menstrual history:   Age at menopause hysterectomy in 2010 had right ovary removed, has had hot flashes and weight gain for the last year  Estrogen therapy No    Activity level:  walks daily for 1 5 - 2 miles, yoga, light weights    Family history:  Sister-anklosing spondylitis  No celiac    All other systems were reviewed and are negative  The following portions of the patient's history were reviewed and updated as appropriate: allergies, current medications, past family history, past medical history, past social history, past surgical history, and problem list     Review of Systems   Constitutional: Negative for chills and fever     HENT: Negative for ear pain and sore throat  Eyes: Negative for pain and visual disturbance  Respiratory: Negative for cough and shortness of breath  Cardiovascular: Negative for chest pain and palpitations  Gastrointestinal: Negative for abdominal pain and vomiting  Genitourinary: Negative for dysuria and hematuria  Musculoskeletal: Positive for arthralgias and back pain  Skin: Negative for color change and rash  Neurological: Negative for seizures and syncope  All other systems reviewed and are negative  Objective:      /92   Pulse 73   Wt 71 4 kg (157 lb 6 4 oz)   BMI 27 02 kg/m²          Physical Exam  Vitals and nursing note reviewed  Constitutional:       General: She is not in acute distress  Appearance: Normal appearance  She is not ill-appearing, toxic-appearing or diaphoretic  Comments: TTP palpation on hips and ankles  Lateral hands are reddish, no papules, no deformities noted on knuckles   HENT:      Head: Normocephalic and atraumatic  Eyes:      Extraocular Movements: Extraocular movements intact  Cardiovascular:      Rate and Rhythm: Normal rate and regular rhythm  Pulses: Normal pulses  Pulmonary:      Effort: Pulmonary effort is normal    Abdominal:      General: Abdomen is flat  Musculoskeletal:      Right lower leg: No edema  Left lower leg: No edema  Skin:     General: Skin is warm  Neurological:      General: No focal deficit present  Mental Status: She is alert and oriented to person, place, and time     Psychiatric:         Mood and Affect: Mood normal          Behavior: Behavior normal

## 2023-03-27 NOTE — PATIENT INSTRUCTIONS
Take Vit D 2000 daily   Take Ca 600 daily  DEXA in 2024, if improved osteoporosis may give bisphosphates drug holiday   After lab work will determine if bisphonates appropriate

## 2023-04-03 DIAGNOSIS — K58.0 IRRITABLE BOWEL SYNDROME WITH DIARRHEA: ICD-10-CM

## 2023-04-03 RX ORDER — DICYCLOMINE HYDROCHLORIDE 10 MG/1
10 CAPSULE ORAL
Qty: 90 CAPSULE | Refills: 2 | Status: SHIPPED | OUTPATIENT
Start: 2023-04-03

## 2023-04-21 LAB — HCV AB SER-ACNC: NEGATIVE

## 2023-05-05 ENCOUNTER — RA CDI HCC (OUTPATIENT)
Dept: OTHER | Facility: HOSPITAL | Age: 48
End: 2023-05-05

## 2023-05-05 NOTE — PROGRESS NOTES
[facility]  coding opportunities       Chart reviewed, no opportunity found: CHART REVIEWED, NO OPPORTUNITY FOUND        Patients Insurance        Commercial Insurance: [address] [place] [address]

## 2023-05-11 ENCOUNTER — OFFICE VISIT (OUTPATIENT)
Dept: FAMILY MEDICINE CLINIC | Facility: CLINIC | Age: 48
End: 2023-05-11

## 2023-05-11 VITALS
DIASTOLIC BLOOD PRESSURE: 80 MMHG | HEART RATE: 88 BPM | OXYGEN SATURATION: 98 % | HEIGHT: 64 IN | RESPIRATION RATE: 14 BRPM | BODY MASS INDEX: 26.98 KG/M2 | SYSTOLIC BLOOD PRESSURE: 120 MMHG | WEIGHT: 158 LBS | TEMPERATURE: 98.2 F

## 2023-05-11 DIAGNOSIS — M45.9 ANKYLOSING SPONDYLITIS, UNSPECIFIED SITE OF SPINE (HCC): ICD-10-CM

## 2023-05-11 DIAGNOSIS — N95.9 POST MENOPAUSAL PROBLEMS: ICD-10-CM

## 2023-05-11 DIAGNOSIS — Z00.00 ANNUAL PHYSICAL EXAM: Primary | ICD-10-CM

## 2023-05-11 PROBLEM — Z86.16 HISTORY OF COVID-19: Status: ACTIVE | Noted: 2023-01-24

## 2023-05-11 PROBLEM — M54.41 ACUTE RIGHT-SIDED LOW BACK PAIN WITH RIGHT-SIDED SCIATICA: Status: RESOLVED | Noted: 2019-10-24 | Resolved: 2023-05-11

## 2023-05-11 PROBLEM — Z86.16 HISTORY OF COVID-19: Status: RESOLVED | Noted: 2023-01-24 | Resolved: 2023-05-11

## 2023-05-11 NOTE — PROGRESS NOTES
[address]    NAME: Francisco Mcbride  AGE: 49 y o  SEX: female  : 1975     DATE: 2023     Assessment and Plan:     Problem List Items Addressed This Visit        Musculoskeletal and Integument    Ankylosing spondylitis ([address] )     Still wit back issues but better since exercise            Other    Annual physical exam - Primary    Post menopausal problems    Relevant Orders    Ambulatory Referral to Obstetrics / Gynecology       Immunizations and preventive care screenings were discussed with patient today  Appropriate education was printed on patient's after visit summary  Counseling:  Dental Health: discussed importance of regular tooth brushing, flossing, and dental visits  BMI Counseling: Body mass index is 26 97 kg/m²  The BMI is above normal  Nutrition recommendations include encouraging healthy choices of fruits and vegetables  Rationale for BMI follow-up plan is due to patient being overweight or obese  Depression Screening and Follow-up Plan: Patient was screened for depression during today's encounter  They screened negative with a PHQ-2 score of 0  Return in 6 months (on 2023) for Recheck  Chief Complaint:     Chief Complaint   Patient presents with   • Annual Exam     Patient here for annual wellness exam       History of Present Illness:     Adult Annual Physical   Patient here for a comprehensive physical exam  The patient reports no problems  Diet and Physical Activity  Diet/Nutrition: well balanced diet  Exercise: walking and strength training exercises  Depression Screening  PHQ-2/9 Depression Screening    Little interest or pleasure in doing things: 0 - not at all  Feeling down, depressed, or hopeless: 0 - not at all  PHQ-2 Score: 0  PHQ-2 Interpretation: Negative depression screen       General Health  Sleep: sleeps poorly     Hearing: normal - bilateral   Vision: wears glasses  Dental: regular dental visits  /GYN Health  Patient is: postmenopausal  Last menstrual period: n/a  Contraceptive method: n/a  Review of Systems:     Review of Systems   Constitutional: Negative  HENT: Negative  Eyes: Negative  Respiratory: Negative  Cardiovascular: Negative  Gastrointestinal: Negative  Endocrine: Negative  Genitourinary: Negative  Musculoskeletal: Negative  Skin: Negative  Allergic/Immunologic: Negative  Neurological: Negative  Hematological: Negative  Psychiatric/Behavioral: Negative         Past Medical History:     Past Medical History:   Diagnosis Date   • Acute right-sided low back pain with right-sided sciatica 10/24/2019   • Fibromyalgia    •  2 para 2    • History of COVID-19 2023   • Hypertension    • IBS (irritable bowel syndrome)    • Melanoma ([facility] [address] )    • Migraine       Past Surgical History:     Past Surgical History:   Procedure Laterality Date   • COLONOSCOPY  2018    Fiberoptic   • DILATION AND CURETTAGE OF UTERUS     • HYSTERECTOMY     • HYSTEROSCOPY W/ ENDOMETRIAL ABLATION     • MAMMO (HISTORICAL) Bilateral 2018    NEG   • OVARIAN CYST DRAINAGE     • SALPINGOOPHORECTOMY Right    • TONSILLECTOMY        Social History:     Social History     Socioeconomic History   • Marital status: /Civil Union     Spouse name: None   • Number of children: 2   • Years of education: None   • Highest education level: None   Occupational History   • None   Tobacco Use   • Smoking status: Never   • Smokeless tobacco: Never   Vaping Use   • Vaping Use: Never used   Substance and Sexual Activity   • Alcohol use: Not Currently     Comment: seldomly    • Drug use: No   • Sexual activity: Yes     Partners: Male   Other Topics Concern   • None   Social History Narrative    Drinks coffee     Social Determinants of Health     Financial Resource Strain: Not on file   Food Insecurity: Not on file   Transportation Needs: "Not on file   Physical Activity: Not on file   Stress: Not on file   Social Connections: Not on file   Intimate Partner Violence: Not on file   Housing Stability: Not on file      Family History:     Family History   Problem Relation Age of Onset   • Hypertension Mother         essential   • Diabetes type II Mother    • Diabetes Mother    • Diverticulitis Mother    • Melanoma Father    • Skin cancer Father    • Ulcerative colitis Sister    • HLA-B27 positive Sister    • Hypertension Maternal Grandmother    • Esophageal cancer Maternal Grandfather    • Cancer Paternal Grandmother    • Stomach cancer Paternal Grandmother    • Asthma Family    • Depression Family    • Breast cancer Maternal Aunt       Current Medications:     Current Outpatient Medications   Medication Sig Dispense Refill   • CALCIUM PO Take by mouth     • Cholecalciferol (VITAMIN D3) 1000 units CHEW Taking it Monday, Wednesday, Friday     • dicyclomine (BENTYL) 10 mg capsule Take 1 capsule (10 mg total) by mouth 3 (three) times a day before meals 90 capsule 2   • ELDERBERRY PO Take by mouth     • Lactobacillus (ACIDOPHILUS/BIFIDUS PO) Take by mouth     • Multiple Vitamins-Minerals (WOMENS MULTI PO) Take by mouth     • nadolol (CORGARD) 20 mg tablet TAKE 1 TABLET BY MOUTH EVERY DAY 90 tablet 3     No current facility-administered medications for this visit  Allergies: Allergies   Allergen Reactions   • Erythromycin Vomiting and Other (See Comments)     nausea  Reaction Date: 19Apr2011;       Physical Exam:     /80 (BP Location: Left arm, Patient Position: Sitting, Cuff Size: Standard)   Pulse 88   Temp 98 2 °F (36 8 °C) (Tympanic)   Resp 14   Ht 5' 4 17\" (1 63 m)   Wt 71 7 kg (158 lb)   SpO2 98%   BMI 26 97 kg/m²     Physical Exam  Vitals and nursing note reviewed  Constitutional:       Appearance: She is well-developed  HENT:      Head: Normocephalic and atraumatic        Right Ear: External ear normal       Left Ear: External " ear normal       Nose: Nose normal    Eyes:      Extraocular Movements: Extraocular movements intact  Conjunctiva/sclera: Conjunctivae normal       Pupils: Pupils are equal, round, and reactive to light  Cardiovascular:      Rate and Rhythm: Normal rate and regular rhythm  Heart sounds: Normal heart sounds  Pulmonary:      Effort: Pulmonary effort is normal       Breath sounds: Normal breath sounds  Abdominal:      General: Abdomen is flat  Bowel sounds are normal       Palpations: Abdomen is soft  Musculoskeletal:         General: Normal range of motion  Cervical back: Normal range of motion and neck supple  Skin:     General: Skin is warm and dry  Capillary Refill: Capillary refill takes less than 2 seconds  Neurological:      General: No focal deficit present  Mental Status: She is alert and oriented to person, place, and time  Psychiatric:         Mood and Affect: Mood normal          Behavior: Behavior normal          Thought Content:  Thought content normal          Judgment: Judgment normal           Arthur Lo DO  [facility]

## 2023-06-07 ENCOUNTER — HOSPITAL ENCOUNTER (OUTPATIENT)
Dept: RADIOLOGY | Facility: HOSPITAL | Age: 48
Discharge: HOME | End: 2023-06-07
Payer: COMMERCIAL

## 2023-06-07 DIAGNOSIS — M25.552 BILATERAL HIP PAIN: ICD-10-CM

## 2023-06-07 DIAGNOSIS — M25.552 BILATERAL HIP PAIN: Primary | ICD-10-CM

## 2023-06-07 DIAGNOSIS — M25.551 BILATERAL HIP PAIN: Primary | ICD-10-CM

## 2023-06-07 DIAGNOSIS — M25.551 BILATERAL HIP PAIN: ICD-10-CM

## 2023-06-07 PROCEDURE — 73521 X-RAY EXAM HIPS BI 2 VIEWS: CPT

## 2023-06-16 ENCOUNTER — APPOINTMENT (OUTPATIENT)
Dept: RADIOLOGY | Facility: AMBULARY SURGERY CENTER | Age: 48
End: 2023-06-16
Attending: ORTHOPAEDIC SURGERY
Payer: COMMERCIAL

## 2023-06-16 ENCOUNTER — OFFICE VISIT (OUTPATIENT)
Dept: OBGYN CLINIC | Facility: CLINIC | Age: 48
End: 2023-06-16
Payer: COMMERCIAL

## 2023-06-16 VITALS
HEIGHT: 64 IN | DIASTOLIC BLOOD PRESSURE: 94 MMHG | BODY MASS INDEX: 26.98 KG/M2 | WEIGHT: 158 LBS | SYSTOLIC BLOOD PRESSURE: 151 MMHG | HEART RATE: 62 BPM

## 2023-06-16 DIAGNOSIS — M41.9 SCOLIOSIS OF LUMBAR SPINE, UNSPECIFIED SCOLIOSIS TYPE: ICD-10-CM

## 2023-06-16 DIAGNOSIS — M25.552 BILATERAL HIP PAIN: ICD-10-CM

## 2023-06-16 DIAGNOSIS — M25.551 BILATERAL HIP PAIN: ICD-10-CM

## 2023-06-16 DIAGNOSIS — M70.61 GREATER TROCHANTERIC BURSITIS OF BOTH HIPS: Primary | ICD-10-CM

## 2023-06-16 DIAGNOSIS — M76.892 TENDINITIS INVOLVING LEFT HIP ABDUCTORS: ICD-10-CM

## 2023-06-16 DIAGNOSIS — M70.62 GREATER TROCHANTERIC BURSITIS OF BOTH HIPS: Primary | ICD-10-CM

## 2023-06-16 PROCEDURE — 20610 DRAIN/INJ JOINT/BURSA W/O US: CPT | Performed by: ORTHOPAEDIC SURGERY

## 2023-06-16 PROCEDURE — 72100 X-RAY EXAM L-S SPINE 2/3 VWS: CPT

## 2023-06-16 PROCEDURE — 99204 OFFICE O/P NEW MOD 45 MIN: CPT | Performed by: ORTHOPAEDIC SURGERY

## 2023-06-16 RX ORDER — TRIAMCINOLONE ACETONIDE 40 MG/ML
80 INJECTION, SUSPENSION INTRA-ARTICULAR; INTRAMUSCULAR
Status: COMPLETED | OUTPATIENT
Start: 2023-06-16 | End: 2023-06-16

## 2023-06-16 RX ORDER — BUPIVACAINE HYDROCHLORIDE 5 MG/ML
2 INJECTION, SOLUTION EPIDURAL; INTRACAUDAL
Status: COMPLETED | OUTPATIENT
Start: 2023-06-16 | End: 2023-06-16

## 2023-06-16 RX ORDER — METHYLPREDNISOLONE 4 MG/1
TABLET ORAL
Qty: 1 EACH | Refills: 0 | Status: SHIPPED | OUTPATIENT
Start: 2023-06-16

## 2023-06-16 RX ORDER — TRIAMCINOLONE ACETONIDE 40 MG/ML
40 INJECTION, SUSPENSION INTRA-ARTICULAR; INTRAMUSCULAR
Status: COMPLETED | OUTPATIENT
Start: 2023-06-16 | End: 2023-06-16

## 2023-06-16 RX ADMIN — TRIAMCINOLONE ACETONIDE 40 MG: 40 INJECTION, SUSPENSION INTRA-ARTICULAR; INTRAMUSCULAR at 14:00

## 2023-06-16 RX ADMIN — BUPIVACAINE HYDROCHLORIDE 2 ML: 5 INJECTION, SOLUTION EPIDURAL; INTRACAUDAL at 14:00

## 2023-06-16 RX ADMIN — TRIAMCINOLONE ACETONIDE 80 MG: 40 INJECTION, SUSPENSION INTRA-ARTICULAR; INTRAMUSCULAR at 14:00

## 2023-06-16 NOTE — PROGRESS NOTES
"Assessment:  1  Bilateral hip pain  Ambulatory Referral to Orthopedic Surgery    XR spine lumbar 2 or 3 views injury        Patient Active Problem List   Diagnosis   • Allergic rhinitis   • Benign essential hypertension   • Osteoporosis   • Esophageal reflux   • Headache, migraine   • Irritable bowel syndrome   • Multiple joint pain   • Spider veins of both lower extremities   • Vitamin D deficiency   • Well adult exam   • Annual physical exam   • Ankylosing spondylitis (HCC)   • Fibromyalgia   • Right wrist pain   • Post menopausal problems       Plan:    49 y o  female with ***    • ***    {viscolist:29677}    The patient was seen and examined by Dr Jose Stern and myself  The assessment and plan were formulated by Dr Jose Stern and I assisted in carrying it out  To Do Next Visit:  {To do next visit:45200::\" \"}      Subjective:   Patient ID: Luci Saenz is a 49 y o  female   HPI    Patient comes in today with regards to ***  Patient is referred to us by Kristina Courtney DO for further evaluation  The patient reports that the pain been going on for ***  Injury or trauma prior to onset of pain: ***  Pain is located in the ***  It is worsened with ***, and is made better with ***  Treatments tried: ***   The pain *** radiate ***  Old injuries or prior surgeries: ***  Numbness or tingling: ***         The following portions of the patient's history were reviewed and updated as appropriate: allergies, current medications, past family history, past social history, past surgical history and problem list     Social History     Socioeconomic History   • Marital status: /Civil Union     Spouse name: Not on file   • Number of children: 2   • Years of education: Not on file   • Highest education level: Not on file   Occupational History   • Not on file   Tobacco Use   • Smoking status: Never   • Smokeless tobacco: Never   Vaping Use   • Vaping Use: Never used   Substance and Sexual Activity   • Alcohol use: Not Currently " Comment: seldomly    • Drug use: No   • Sexual activity: Yes     Partners: Male   Other Topics Concern   • Not on file   Social History Narrative    Drinks coffee     Social Determinants of Health     Financial Resource Strain: Not on file   Food Insecurity: Not on file   Transportation Needs: Not on file   Physical Activity: Not on file   Stress: Not on file   Social Connections: Not on file   Intimate Partner Violence: Not on file   Housing Stability: Not on file     Past Medical History:   Diagnosis Date   • Acute right-sided low back pain with right-sided sciatica 10/24/2019   • Fibromyalgia    •  2 para 2    • History of COVID-19 2023   • Hypertension    • IBS (irritable bowel syndrome)    • Melanoma ([facility] [address] )    • Migraine      Past Surgical History:   Procedure Laterality Date   • COLONOSCOPY      Fiberoptic   • DILATION AND CURETTAGE OF UTERUS     • HYSTERECTOMY     • HYSTEROSCOPY W/ ENDOMETRIAL ABLATION     • MAMMO (HISTORICAL) Bilateral 2018    NEG   • OVARIAN CYST DRAINAGE     • SALPINGOOPHORECTOMY Right    • TONSILLECTOMY       Allergies   Allergen Reactions   • Erythromycin Vomiting and Other (See Comments)     nausea  Reaction Date: 2011;      Current Outpatient Medications on File Prior to Visit   Medication Sig Dispense Refill   • CALCIUM PO Take by mouth     • Cholecalciferol (VITAMIN D3) 1000 units CHEW Taking it Monday, Wednesday, Friday     • dicyclomine (BENTYL) 10 mg capsule Take 1 capsule (10 mg total) by mouth 3 (three) times a day before meals (Patient taking differently: Take 10 mg by mouth as needed) 90 capsule 2   • ELDERBERRY PO Take by mouth     • Lactobacillus (ACIDOPHILUS/BIFIDUS PO) Take by mouth     • Multiple Vitamins-Minerals (WOMENS MULTI PO) Take by mouth     • nadolol (CORGARD) 20 mg tablet TAKE 1 TABLET BY MOUTH EVERY DAY 90 tablet 3     No current facility-administered medications on file prior to visit         Review of "Systems      Objective:    Vitals:    06/16/23 1412   BP: 151/94   Pulse: 62       Physical Exam    Ortho Exam    {Imaging Review Statement:[id number]}    Procedures  {Was Procdoc done:95925::\"No Procedures performed today\"}    Scribe Attestation    I,:   am acting as a scribe while in the presence of the attending physician :       I,:   personally performed the services described in this documentation    as scribed in my presence :             Portions of the record may have been created with voice recognition software  Occasional wrong word or \"sound a like\" substitutions may have occurred due to the inherent limitations of voice recognition software  Read the chart carefully and recognize, using context, where substitutions have occurred    "

## 2023-08-08 DIAGNOSIS — G43.909 MIGRAINE WITHOUT STATUS MIGRAINOSUS, NOT INTRACTABLE, UNSPECIFIED MIGRAINE TYPE: ICD-10-CM

## 2023-08-08 DIAGNOSIS — I10 ESSENTIAL HYPERTENSION: ICD-10-CM

## 2023-08-08 RX ORDER — NADOLOL 20 MG/1
TABLET ORAL
Qty: 90 TABLET | Refills: 3 | Status: SHIPPED | OUTPATIENT
Start: 2023-08-08

## 2023-09-13 ENCOUNTER — OFFICE VISIT (OUTPATIENT)
Dept: GASTROENTEROLOGY | Facility: CLINIC | Age: 48
End: 2023-09-13
Payer: COMMERCIAL

## 2023-09-13 VITALS
BODY MASS INDEX: 26.29 KG/M2 | DIASTOLIC BLOOD PRESSURE: 91 MMHG | OXYGEN SATURATION: 97 % | WEIGHT: 154 LBS | SYSTOLIC BLOOD PRESSURE: 141 MMHG | HEIGHT: 64 IN | HEART RATE: 79 BPM

## 2023-09-13 DIAGNOSIS — K64.8 INTERNAL HEMORRHOID: ICD-10-CM

## 2023-09-13 DIAGNOSIS — K62.5 RECTAL BLEEDING: Primary | ICD-10-CM

## 2023-09-13 DIAGNOSIS — K58.0 IRRITABLE BOWEL SYNDROME WITH DIARRHEA: ICD-10-CM

## 2023-09-13 PROCEDURE — 99214 OFFICE O/P EST MOD 30 MIN: CPT | Performed by: INTERNAL MEDICINE

## 2023-09-13 RX ORDER — DICYCLOMINE HYDROCHLORIDE 10 MG/1
10 CAPSULE ORAL
Qty: 90 CAPSULE | Refills: 2 | Status: SHIPPED | OUTPATIENT
Start: 2023-09-13

## 2023-09-13 NOTE — PROGRESS NOTES
[facility]  Progress Note - Balbina Florian 49 y.o. female MRN: [id number]    Unit/Bed#:  Encounter: [id number]    Assessment/Plan:    1. Intermittent bright red blood per rectum-possibly hemorrhoids as these were noted on colonoscopy in 2018. Palpated internal hemorrhoid on exam.  -Recommend high-fiber supplement 3-4 times a week which seems to be helping the patient.  -Recommend Preparation H suppository for 10 nights.  -Recommend sitz bath if needed.  -Avoid straining and bearing down, recommend squatty potty. -May benefit from hemorrhoidal banding in the future if symptoms persist.  -Hold off on colonoscopy at this time as previous notable only for internal hemorrhoids. 2.  LA grade a esophagitis: Asymptomatic at this time, off of PPI. Continue to follow GERD diet. 3  Labs are reviewed, hemoglobin 14, platelets 683. Vitamin D levels are normal.  TSH is normal.  Liver enzymes are normal.    4.  Colon cancer screening: Up-to-date, last colonoscopy was in 2018    Subjective:     43-year-old female with history of fibromyalgia, hypertension, IBS, melanoma, presents for follow-up. Patient has previously been seen for symptoms of irritable bowel syndrome with alternating diarrhea and constipation. Patient reports that she was away on vacation at which time she started experience symptoms of constipation. Patient reports that upon returning she had hard bowel movement followed by bright red blood per rectum prompting her to make this order. Patient reports that the symptoms occurred once or twice again however they have not recurred since then. She denies any abdominal pain, unintentional weight loss or change in bowel habits otherwise. She reports having had her last colonoscopy in November 2018. She was recommended repeat colonoscopy at 10-year interval.  She will be due for repeat colonoscopy in 2028. EGD was done which was notable for LA grade a esophagitis as well. Biopsies were negative for H. pylori. Patient denies any symptoms of acid reflux at this time. She now reports intermittent bright red blood on and off. Objective:     Vitals: Blood pressure 141/91, pulse 79, height 5' 4.17" (1.63 m), weight 69.9 kg (154 lb), SpO2 97 %. ,Body mass index is 26.29 kg/m². [unfilled]    Physical Exam:    GEN: wn/wd, NAD  HEENT: MMM, no cervical or supraclavicular LAD, anciteric  CV: RRR, no m/r/g  CHEST: CTA b/l, no w/r/r  ABD: +BS, soft, NT/ND, no hepatosplenomegaly  EXT: no c/c/e  SKIN: no rashes  NEURO: aaox3  Rectum: External skin tag, internal hemorrhoid palpated, normal rectal tone, no blood on the glove. No distal rectal masses palpated. Invasive Devices       None                           Lab, Imaging and other studies:     No visits with results within 1 Day(s) from this visit. Latest known visit with results is:   Orders Only on 04/21/2023   Component Date Value    HEP C AB 04/21/2023 NEGATIVE          I have personally reviewed pertinent films in PACS    No current facility-administered medications for this visit.              Answers submitted by the patient for this visit:  Abdominal Pain Questionnaire (Submitted on 9/11/2023)  Chief Complaint: Abdominal pain  Chronicity: recurrent  Onset: more than 1 year ago  Onset quality: undetermined  Frequency: intermittently  Episode duration: 1 Hours  Progression since onset: unchanged  Pain location: generalized abdominal region  Pain - numeric: 4/10  Pain quality: aching, cramping, sharp  Radiates to: back  anorexia: Yes  arthralgias: Yes  belching: No  constipation: Yes  diarrhea: Yes  dysuria: No  fever: No  flatus: No  frequency: No  headaches: No  hematochezia: Yes  hematuria: No  melena: No  myalgias: No  nausea: Yes  weight loss: No  vomiting: No  Aggravated by: nothing  Relieved by: bowel movements, certain positions, palpation, passing flatus  Diagnostic workup: GI consult    Answers submitted by the patient for this visit:  Abdominal Pain Questionnaire (Submitted on 9/11/2023)  Chief Complaint: Abdominal pain  Chronicity: recurrent  Onset: more than 1 year ago  Onset quality: undetermined  Frequency: intermittently  Episode duration: 1 Hours  Progression since onset: unchanged  Pain location: generalized abdominal region  Pain - numeric: 4/10  Pain quality: aching, cramping, sharp  Radiates to: back  anorexia: Yes  arthralgias: Yes  belching: No  constipation: Yes  diarrhea: Yes  dysuria: No  fever: No  flatus: No  frequency: No  headaches: No  hematochezia: Yes  hematuria: No  melena: No  myalgias: No  nausea: Yes  weight loss: No  vomiting: No  Aggravated by: nothing  Relieved by: bowel movements, certain positions, palpation, passing flatus  Diagnostic workup: GI consult

## 2023-10-03 ENCOUNTER — TELEPHONE (OUTPATIENT)
Dept: NEUROLOGY | Facility: CLINIC | Age: 48
End: 2023-10-03

## 2023-10-03 NOTE — TELEPHONE ENCOUNTER
Patient called to request an appointment with Dr. Ivone Stevenson    No referral in place    Informed the patient we schedule from referrals and we could create one for them    Explained the scheduling and triage process to the patient  And based on the DX Dr. Ivone Stevenson may be one of the options for them to see    Patient stated NO, I should be allowed to choose which DrLauryn I want and they stated they will Cb when they have figured out another way to see the doctor they want    Patient hung up     Patient declined to complete the triage at this time and no referral was created
None

## 2023-11-14 ENCOUNTER — OFFICE VISIT (OUTPATIENT)
Dept: FAMILY MEDICINE CLINIC | Facility: CLINIC | Age: 48
End: 2023-11-14
Payer: COMMERCIAL

## 2023-11-14 VITALS
WEIGHT: 156.8 LBS | DIASTOLIC BLOOD PRESSURE: 84 MMHG | HEART RATE: 67 BPM | RESPIRATION RATE: 16 BRPM | BODY MASS INDEX: 26.77 KG/M2 | OXYGEN SATURATION: 99 % | SYSTOLIC BLOOD PRESSURE: 126 MMHG | HEIGHT: 64 IN | TEMPERATURE: 97.6 F

## 2023-11-14 DIAGNOSIS — K58.9 IRRITABLE BOWEL SYNDROME, UNSPECIFIED TYPE: ICD-10-CM

## 2023-11-14 DIAGNOSIS — M45.9 ANKYLOSING SPONDYLITIS, UNSPECIFIED SITE OF SPINE (HCC): ICD-10-CM

## 2023-11-14 DIAGNOSIS — M81.0 OSTEOPOROSIS, UNSPECIFIED OSTEOPOROSIS TYPE, UNSPECIFIED PATHOLOGICAL FRACTURE PRESENCE: ICD-10-CM

## 2023-11-14 DIAGNOSIS — I10 BENIGN ESSENTIAL HYPERTENSION: Primary | ICD-10-CM

## 2023-11-14 PROCEDURE — 99214 OFFICE O/P EST MOD 30 MIN: CPT | Performed by: FAMILY MEDICINE

## 2023-11-14 NOTE — PROGRESS NOTES
Assessment/Plan:    1. Benign essential hypertension  Assessment & Plan:  Stable on nadolol      2. Ankylosing spondylitis, unspecified site of spine [facility])  Assessment & Plan:  MRI with arthritic changes      3. Irritable bowel syndrome, unspecified type  Assessment & Plan:  Stable on bentyl      4. Osteoporosis, unspecified osteoporosis type, unspecified pathological fracture presence  Assessment & Plan:  Had dexa  Not currently on treatment  Vitamin d and calcium          Depression Screening and Follow-up Plan: Patient was screened for depression during today's encounter. They screened negative with a PHQ-2 score of 0. There are no Patient Instructions on file for this visit. Return in about 6 months (around 5/14/2024) for Annual physical.    Subjective:      Patient ID: Almaz Chaparro is a 51 y.o. female. Chief Complaint   Patient presents with   • Follow-up     Pt. Is here for 6 months follow up. Here for follow up  Had MRI lumbar spine  Had accupuncture  Flu shot was 10/13/2023  Mammogram was DEC 2022      Hypertension  This is a chronic problem. The current episode started more than 1 year ago. The problem is unchanged. The problem is controlled. There are no associated agents to hypertension. The current treatment provides significant improvement. There are no compliance problems. The following portions of the patient's history were reviewed and updated as appropriate: allergies, current medications, past family history, past medical history, past social history, past surgical history and problem list.    Review of Systems   Constitutional: Negative. HENT: Negative. Eyes: Negative. Respiratory: Negative. Cardiovascular: Negative. Gastrointestinal: Negative. Endocrine: Negative. Genitourinary: Negative. Musculoskeletal:  Positive for back pain. Allergic/Immunologic: Negative. Neurological: Negative. Hematological: Negative.     Psychiatric/Behavioral: Negative. Current Outpatient Medications   Medication Sig Dispense Refill   • CALCIUM PO Take by mouth     • Cholecalciferol (VITAMIN D3) 1000 units CHEW Taking it Monday, Wednesday, Friday     • dicyclomine (BENTYL) 10 mg capsule Take 1 capsule (10 mg total) by mouth 3 (three) times a day before meals 90 capsule 2   • ELDERBERRY PO Take by mouth     • Lactobacillus (ACIDOPHILUS/BIFIDUS PO) Take by mouth     • Multiple Vitamins-Minerals (WOMENS MULTI PO) Take by mouth     • nadolol (CORGARD) 20 mg tablet TAKE 1 TABLET BY MOUTH EVERY DAY 90 tablet 3     No current facility-administered medications for this visit. Objective:    /84 (BP Location: Left arm, Patient Position: Sitting, Cuff Size: Standard)   Pulse 67   Temp 97.6 °F (36.4 °C) (Tympanic)   Resp 16   Ht 5' 4" (1.626 m)   Wt 71.1 kg (156 lb 12.8 oz)   SpO2 99%   BMI 26.91 kg/m²        Physical Exam  Vitals and nursing note reviewed. Constitutional:       Appearance: She is well-developed. HENT:      Head: Normocephalic and atraumatic. Nose: Nose normal.   Eyes:      General: Lids are normal.      Conjunctiva/sclera: Conjunctivae normal.      Pupils: Pupils are equal, round, and reactive to light. Cardiovascular:      Rate and Rhythm: Normal rate and regular rhythm. Heart sounds: Normal heart sounds, S1 normal and S2 normal.   Pulmonary:      Effort: Pulmonary effort is normal.      Breath sounds: Normal breath sounds. Abdominal:      General: Bowel sounds are normal.      Palpations: Abdomen is soft. Musculoskeletal:         General: Normal range of motion. Cervical back: Normal range of motion and neck supple. Skin:     General: Skin is warm and dry. Neurological:      Mental Status: She is alert and oriented to person, place, and time. Deep Tendon Reflexes: Reflexes are normal and symmetric.    Psychiatric:         Speech: Speech normal.         Behavior: Behavior normal.         Thought Content: Thought content normal.         Judgment: Judgment normal.                Marianne Mota, DO

## 2023-11-16 ENCOUNTER — VBI (OUTPATIENT)
Dept: ADMINISTRATIVE | Facility: OTHER | Age: 48
End: 2023-11-16

## 2024-02-21 PROBLEM — Z00.00 WELL ADULT EXAM: Status: RESOLVED | Noted: 2018-04-10 | Resolved: 2024-02-21

## 2024-05-02 DIAGNOSIS — I10 BENIGN ESSENTIAL HYPERTENSION: Primary | ICD-10-CM

## 2024-05-02 DIAGNOSIS — E55.9 VITAMIN D DEFICIENCY: ICD-10-CM

## 2024-05-02 DIAGNOSIS — M81.0 OSTEOPOROSIS, UNSPECIFIED OSTEOPOROSIS TYPE, UNSPECIFIED PATHOLOGICAL FRACTURE PRESENCE: ICD-10-CM

## 2024-05-04 LAB
25(OH)D3+25(OH)D2 SERPL-MCNC: 48 NG/ML (ref 30–100)
ALBUMIN SERPL-MCNC: 4.1 G/DL (ref 3.5–5.7)
ALP SERPL-CCNC: 66 U/L (ref 35–120)
ALT SERPL-CCNC: 16 U/L
ANION GAP SERPL CALCULATED.3IONS-SCNC: 7 MMOL/L (ref 3–11)
AST SERPL-CCNC: 17 U/L
BILIRUB SERPL-MCNC: 0.6 MG/DL (ref 0.2–1)
BUN SERPL-MCNC: 13 MG/DL (ref 7–25)
CALCIUM SERPL-MCNC: 9.9 MG/DL (ref 8.5–10.1)
CHLORIDE SERPL-SCNC: 102 MMOL/L (ref 100–109)
CHOLEST SERPL-MCNC: 194 MG/DL
CHOLEST/HDLC SERPL: 3.1 {RATIO}
CO2 SERPL-SCNC: 33 MMOL/L (ref 21–31)
CREAT SERPL-MCNC: 0.75 MG/DL (ref 0.4–1.1)
CYTOLOGY CMNT CVX/VAG CYTO-IMP: ABNORMAL
ERYTHROCYTE [DISTWIDTH] IN BLOOD BY AUTOMATED COUNT: 12.8 % (ref 12–16)
GFR/BSA.PRED SERPLBLD CYS-BASED-ARV: 97 ML/MIN/{1.73_M2}
GLUCOSE SERPL-MCNC: 87 MG/DL (ref 65–99)
HCT VFR BLD AUTO: 43 % (ref 35–43)
HDLC SERPL-MCNC: 62 MG/DL (ref 23–92)
HGB BLD-MCNC: 14.8 G/DL (ref 11.5–14.5)
LDLC SERPL CALC-MCNC: 110 MG/DL
MCH RBC QN AUTO: 30 PG (ref 26–34)
MCHC RBC AUTO-ENTMCNC: 34.5 G/DL (ref 32–37)
MCV RBC AUTO: 87 FL (ref 80–100)
NONHDLC SERPL-MCNC: 132 MG/DL
PLATELET # BLD AUTO: 370 THOU/CMM (ref 140–350)
PMV BLD REES-ECKER: 8.5 FL (ref 7.5–11.3)
POTASSIUM SERPL-SCNC: 5.3 MMOL/L (ref 3.5–5.2)
PROT SERPL-MCNC: 7.3 G/DL (ref 6.3–8.3)
RBC # BLD AUTO: 4.94 MILL/CMM (ref 3.7–4.7)
SODIUM SERPL-SCNC: 142 MMOL/L (ref 135–145)
TRIGL SERPL-MCNC: 110 MG/DL
TSH SERPL-ACNC: 1.58 UIU/ML (ref 0.45–5.33)
WBC # BLD AUTO: 6.6 THOU/CMM (ref 4–10)

## 2024-05-05 ENCOUNTER — RA CDI HCC (OUTPATIENT)
Dept: OTHER | Facility: HOSPITAL | Age: 49
End: 2024-05-05

## 2024-05-13 ENCOUNTER — OFFICE VISIT (OUTPATIENT)
Dept: FAMILY MEDICINE CLINIC | Facility: CLINIC | Age: 49
End: 2024-05-13
Payer: COMMERCIAL

## 2024-05-13 VITALS
OXYGEN SATURATION: 99 % | BODY MASS INDEX: 26.15 KG/M2 | HEART RATE: 69 BPM | TEMPERATURE: 97.2 F | SYSTOLIC BLOOD PRESSURE: 126 MMHG | WEIGHT: 153.2 LBS | DIASTOLIC BLOOD PRESSURE: 84 MMHG | HEIGHT: 64 IN | RESPIRATION RATE: 18 BRPM

## 2024-05-13 DIAGNOSIS — M45.9 ANKYLOSING SPONDYLITIS, UNSPECIFIED SITE OF SPINE (HCC): ICD-10-CM

## 2024-05-13 DIAGNOSIS — E87.5 HYPERKALEMIA: ICD-10-CM

## 2024-05-13 DIAGNOSIS — M81.6 LOCALIZED OSTEOPOROSIS WITHOUT CURRENT PATHOLOGICAL FRACTURE: ICD-10-CM

## 2024-05-13 DIAGNOSIS — I10 BENIGN ESSENTIAL HYPERTENSION: ICD-10-CM

## 2024-05-13 DIAGNOSIS — D69.1 PLATELET DISORDER (HCC): ICD-10-CM

## 2024-05-13 DIAGNOSIS — Z00.00 ANNUAL PHYSICAL EXAM: Primary | ICD-10-CM

## 2024-05-13 PROCEDURE — 99396 PREV VISIT EST AGE 40-64: CPT | Performed by: FAMILY MEDICINE

## 2024-05-13 NOTE — PROGRESS NOTES
ADULT ANNUAL PHYSICAL  [facility]    NAME: Shara Michael  AGE: 48 y.o. SEX: female  : 1975     DATE: 2024     Assessment and Plan:     Problem List Items Addressed This Visit     Benign essential hypertension     Cont nadolol         Osteoporosis     Dexa ordered         Relevant Orders    DXA bone density spine hip and pelvis    Annual physical exam - Primary    Ankylosing spondylitis (HCC)     stable        Other Visit Diagnoses     Hyperkalemia        Relevant Orders    Comprehensive metabolic panel    Platelet disorder (HCC)        Relevant Orders    CBC and differential            Immunizations and preventive care screenings were discussed with patient today. Appropriate education was printed on patient's after visit summary.    Counseling:  Dental Health: discussed importance of regular tooth brushing, flossing, and dental visits.      Depression Screening and Follow-up Plan: Patient was screened for depression during today's encounter. They screened negative with a PHQ-2 score of 0.        Return in about 6 months (around 2024) for Recheck.     Chief Complaint:     Chief Complaint   Patient presents with   • Physical Exam     Patient being seen for physical       History of Present Illness:     Adult Annual Physical   Patient here for a comprehensive physical exam. The patient reports problems - right sided arm numbness started after weight lifting .    Diet and Physical Activity  Diet/Nutrition: well balanced diet.   Exercise: strength training exercises and 5-7 times a week on average.      Depression Screening  PHQ-2/9 Depression Screening    Little interest or pleasure in doing things: 0 - not at all  Feeling down, depressed, or hopeless: 0 - not at all  PHQ-2 Score: 0  PHQ-2 Interpretation: Negative depression screen       General Health  Sleep: sleeps poorly.   Hearing: normal - bilateral.  Vision: no vision problems and wears  glasses.   Dental: regular dental visits.       /GYN Health  Follows with gynecology? yes   Patient is: postmenopausal  Last menstrual period: hysterectomy  Contraceptive method:  hysterectomy .    Advanced Care Planning  Do you have an advanced directive? yes  Do you have a durable medical power of ? yes  ACP document given to the patient? no     Review of Systems:     Review of Systems   Constitutional: Negative.    HENT: Negative.     Eyes: Negative.    Respiratory: Negative.     Cardiovascular: Negative.    Gastrointestinal: Negative.    Endocrine: Negative.    Genitourinary: Negative.    Musculoskeletal: Negative.    Skin: Negative.    Allergic/Immunologic: Negative.    Neurological: Negative.    Hematological: Negative.    Psychiatric/Behavioral: Negative.        Past Medical History:     Past Medical History:   Diagnosis Date   • Acute right-sided low back pain with right-sided sciatica 10/24/2019   • Arthritis    • Fibromyalgia    •  2 para 2    • Headache(784.0)    • History of COVID-19 2023   • Hypertension    • IBS (irritable bowel syndrome)    • Melanoma (HCC)    • Migraine    • Scoliosis       Past Surgical History:     Past Surgical History:   Procedure Laterality Date   • COLONOSCOPY  2018    Fiberoptic   • DILATION AND CURETTAGE OF UTERUS     • HYSTERECTOMY     • HYSTEROSCOPY W/ ENDOMETRIAL ABLATION     • MAMMO (HISTORICAL) Bilateral 2018    NEG   • OVARIAN CYST DRAINAGE     • SALPINGOOPHORECTOMY Right    • TONSILLECTOMY        Social History:     Social History     Socioeconomic History   • Marital status: /Civil Union     Spouse name: None   • Number of children: 2   • Years of education: None   • Highest education level: None   Occupational History   • None   Tobacco Use   • Smoking status: Never     Passive exposure: Never   • Smokeless tobacco: Never   Vaping Use   • Vaping status: Never Used   Substance and Sexual Activity   • Alcohol use: Yes      Alcohol/week: 1.0 standard drink of alcohol     Types: 1 Glasses of wine per week     Comment: seldomly    • Drug use: No   • Sexual activity: Yes     Partners: Male     Birth control/protection: Post-menopausal     Comment: Hysterectomy   Other Topics Concern   • None   Social History Narrative    Drinks coffee     Social Determinants of Health     Financial Resource Strain: Not on file   Food Insecurity: Not on file   Transportation Needs: Not on file   Physical Activity: Not on file   Stress: Not on file   Social Connections: Not on file   Intimate Partner Violence: Not on file   Housing Stability: Not on file      Family History:     Family History   Problem Relation Age of Onset   • Hypertension Mother         essential   • Diabetes type II Mother    • Diabetes Mother    • Diverticulitis Mother    • Melanoma Father    • Skin cancer Father    • Ulcerative colitis Sister    • HLA-B27 positive Sister    • Autoimmune disease Sister    • Hypertension Maternal Grandmother    • Dementia Maternal Grandmother    • Esophageal cancer Maternal Grandfather    • Cancer Paternal Grandmother         Stomach   • Stomach cancer Paternal Grandmother    • Asthma Family    • Depression Family    • Breast cancer Maternal Aunt       Current Medications:     Current Outpatient Medications   Medication Sig Dispense Refill   • CALCIUM PO Take by mouth     • Cholecalciferol (VITAMIN D3) 1000 units CHEW Taking it Monday, Wednesday, Friday     • dicyclomine (BENTYL) 10 mg capsule Take 1 capsule (10 mg total) by mouth 3 (three) times a day before meals 90 capsule 2   • ELDERBERRY PO Take by mouth     • Lactobacillus (ACIDOPHILUS/BIFIDUS PO) Take by mouth     • Multiple Vitamins-Minerals (WOMENS MULTI PO) Take by mouth     • nadolol (CORGARD) 20 mg tablet TAKE 1 TABLET BY MOUTH EVERY DAY 90 tablet 3     No current facility-administered medications for this visit.      Allergies:     Allergies   Allergen Reactions   • Erythromycin Vomiting  "and Other (See Comments)     nausea  Reaction Date: 19Apr2011;       Physical Exam:     /84 (BP Location: Left arm, Patient Position: Sitting, Cuff Size: Standard)   Pulse 69   Temp (!) 97.2 °F (36.2 °C) (Tympanic)   Resp 18   Ht 5' 4.41\" (1.636 m)   Wt 69.5 kg (153 lb 3.2 oz)   SpO2 99%   BMI 25.96 kg/m²     Physical Exam  Vitals and nursing note reviewed.   Constitutional:       Appearance: Normal appearance. She is well-developed.   HENT:      Head: Normocephalic and atraumatic.      Right Ear: External ear normal.      Left Ear: External ear normal.      Nose: Nose normal.   Eyes:      Extraocular Movements: Extraocular movements intact.      Conjunctiva/sclera: Conjunctivae normal.      Pupils: Pupils are equal, round, and reactive to light.   Cardiovascular:      Rate and Rhythm: Normal rate and regular rhythm.      Heart sounds: Normal heart sounds.   Pulmonary:      Effort: Pulmonary effort is normal.      Breath sounds: Normal breath sounds.   Abdominal:      General: Abdomen is flat. Bowel sounds are normal.      Palpations: Abdomen is soft.   Musculoskeletal:         General: Normal range of motion.      Cervical back: Normal range of motion and neck supple.   Skin:     General: Skin is warm and dry.      Capillary Refill: Capillary refill takes less than 2 seconds.   Neurological:      General: No focal deficit present.      Mental Status: She is alert and oriented to person, place, and time.   Psychiatric:         Mood and Affect: Mood normal.         Behavior: Behavior normal.         Thought Content: Thought content normal.         Judgment: Judgment normal.          DO JOSHUA Carter [facility]    "

## 2024-06-17 LAB
ALBUMIN SERPL-MCNC: 4.4 G/DL (ref 3.5–5.7)
ALP SERPL-CCNC: 62 U/L (ref 35–120)
ALT SERPL-CCNC: 14 U/L
ANION GAP SERPL CALCULATED.3IONS-SCNC: 9 MMOL/L (ref 3–11)
AST SERPL-CCNC: 18 U/L
BASOPHILS # BLD AUTO: 0.1 THOU/CMM (ref 0–0.1)
BASOPHILS NFR BLD AUTO: 1 %
BILIRUB SERPL-MCNC: 0.6 MG/DL (ref 0.2–1)
BUN SERPL-MCNC: 16 MG/DL (ref 7–25)
CALCIUM SERPL-MCNC: 10.2 MG/DL (ref 8.5–10.1)
CHLORIDE SERPL-SCNC: 102 MMOL/L (ref 100–109)
CO2 SERPL-SCNC: 33 MMOL/L (ref 21–31)
CREAT SERPL-MCNC: 0.72 MG/DL (ref 0.4–1.1)
CYTOLOGY CMNT CVX/VAG CYTO-IMP: ABNORMAL
DIFFERENTIAL METHOD BLD: ABNORMAL
EOSINOPHIL # BLD AUTO: 0.1 THOU/CMM (ref 0–0.5)
EOSINOPHIL NFR BLD AUTO: 1 %
ERYTHROCYTE [DISTWIDTH] IN BLOOD BY AUTOMATED COUNT: 12.7 % (ref 12–16)
GFR/BSA.PRED SERPLBLD CYS-BASED-ARV: 102 ML/MIN/{1.73_M2}
GLUCOSE SERPL-MCNC: 88 MG/DL (ref 65–99)
HCT VFR BLD AUTO: 40.5 % (ref 35–43)
HGB BLD-MCNC: 14 G/DL (ref 11.5–14.5)
LYMPHOCYTES # BLD AUTO: 2.2 THOU/CMM (ref 1–3)
LYMPHOCYTES NFR BLD AUTO: 31 %
MCH RBC QN AUTO: 29.6 PG (ref 26–34)
MCHC RBC AUTO-ENTMCNC: 34.6 G/DL (ref 32–37)
MCV RBC AUTO: 86 FL (ref 80–100)
MONOCYTES # BLD AUTO: 0.7 THOU/CMM (ref 0.3–1)
MONOCYTES NFR BLD AUTO: 10 %
NEUTROPHILS # BLD AUTO: 4 THOU/CMM (ref 1.8–7.8)
NEUTROPHILS NFR BLD AUTO: 57 %
PLATELET # BLD AUTO: 349 THOU/CMM (ref 140–350)
PMV BLD REES-ECKER: 8.6 FL (ref 7.5–11.3)
POTASSIUM SERPL-SCNC: 4.8 MMOL/L (ref 3.5–5.2)
PROT SERPL-MCNC: 6.7 G/DL (ref 6.3–8.3)
RBC # BLD AUTO: 4.72 MILL/CMM (ref 3.7–4.7)
SODIUM SERPL-SCNC: 144 MMOL/L (ref 135–145)
WBC # BLD AUTO: 7 THOU/CMM (ref 4–10)

## 2024-07-15 ENCOUNTER — HOSPITAL ENCOUNTER (OUTPATIENT)
Facility: HOSPITAL | Age: 49
Discharge: HOME | End: 2024-07-15
Payer: COMMERCIAL

## 2024-07-15 DIAGNOSIS — M81.6 LOCALIZED OSTEOPOROSIS WITHOUT CURRENT PATHOLOGICAL FRACTURE: ICD-10-CM

## 2024-07-15 PROCEDURE — 77080 DXA BONE DENSITY AXIAL: CPT

## 2024-08-05 ENCOUNTER — TELEPHONE (OUTPATIENT)
Age: 49
End: 2024-08-05

## 2024-08-05 NOTE — TELEPHONE ENCOUNTER
"Stella from [facility] customer service called asking if PCP  can send a new dexa scan script reflecting that its a \"routine\" not a diagnostic. Script was written  as a diagnostic with DX code being M81.6. If so Stella is requesting this is faxed to customer service where they can sent it back to billing.  Fax #[phone]  "

## 2024-08-08 DIAGNOSIS — I10 ESSENTIAL HYPERTENSION: ICD-10-CM

## 2024-08-08 DIAGNOSIS — G43.909 MIGRAINE WITHOUT STATUS MIGRAINOSUS, NOT INTRACTABLE, UNSPECIFIED MIGRAINE TYPE: ICD-10-CM

## 2024-08-08 RX ORDER — NADOLOL 20 MG/1
TABLET ORAL
Qty: 90 TABLET | Refills: 1 | Status: SHIPPED | OUTPATIENT
Start: 2024-08-08

## 2024-08-08 NOTE — TELEPHONE ENCOUNTER
Called [facility] [facility] at [phone] and they were closed for a meeting. I will call back and follow up

## 2024-08-12 ENCOUNTER — TELEPHONE (OUTPATIENT)
Dept: FAMILY MEDICINE CLINIC | Facility: CLINIC | Age: 49
End: 2024-08-12

## 2024-08-12 NOTE — TELEPHONE ENCOUNTER
Tc and left message to [employer] concerning Dexa scan and patients request to recode as preventative.

## 2024-08-14 NOTE — TELEPHONE ENCOUNTER
Tc to [employer] provider services for claims [id number] left message about dexa and patient request to recode.

## 2024-08-14 NOTE — TELEPHONE ENCOUNTER
Spoke to provider services today [phone] and spoke to dennis ref#[id number] she stated claim was processed appropriately, non preventative since previously diagnosed with osteoporosis. Not screening, since we know she has osteoporosis and would be fraudulent. The member has a high deductible plan and therefore it goes to her deductible.  Message left for Shara with this information.

## 2024-08-15 NOTE — TELEPHONE ENCOUNTER
Informed Shara of the discussion with [employer] concerning her dexa and outcome as being coded properly.  Provided reference number for Shara to contact and discuss further with her plan.

## 2025-02-17 DIAGNOSIS — I10 ESSENTIAL HYPERTENSION: ICD-10-CM

## 2025-02-17 DIAGNOSIS — G43.909 MIGRAINE WITHOUT STATUS MIGRAINOSUS, NOT INTRACTABLE, UNSPECIFIED MIGRAINE TYPE: ICD-10-CM

## 2025-02-17 RX ORDER — NADOLOL 20 MG/1
20 TABLET ORAL DAILY
Qty: 90 TABLET | Refills: 0 | Status: SHIPPED | OUTPATIENT
Start: 2025-02-17